# Patient Record
Sex: FEMALE | Race: WHITE | NOT HISPANIC OR LATINO | URBAN - METROPOLITAN AREA
[De-identification: names, ages, dates, MRNs, and addresses within clinical notes are randomized per-mention and may not be internally consistent; named-entity substitution may affect disease eponyms.]

---

## 2017-10-19 DIAGNOSIS — H90.2 CONDUCTIVE HEARING LOSS: Primary | ICD-10-CM

## 2017-10-20 ENCOUNTER — OFFICE VISIT (OUTPATIENT)
Dept: AUDIOLOGY | Facility: CLINIC | Age: 32
End: 2017-10-20
Attending: OTOLARYNGOLOGY

## 2017-10-20 ENCOUNTER — OFFICE VISIT (OUTPATIENT)
Dept: OTOLARYNGOLOGY | Facility: CLINIC | Age: 32
End: 2017-10-20

## 2017-10-20 DIAGNOSIS — H90.11 CONDUCTIVE HEARING LOSS OF RIGHT EAR WITH UNRESTRICTED HEARING OF LEFT EAR: Primary | ICD-10-CM

## 2017-10-20 DIAGNOSIS — H93.8X1 SENSATION OF FULLNESS IN RIGHT EAR: ICD-10-CM

## 2017-10-20 DIAGNOSIS — Q01.8 TEMPORAL ENCEPHALOCELE (H): ICD-10-CM

## 2017-10-20 DIAGNOSIS — H92.01 PAIN IN RIGHT EAR: ICD-10-CM

## 2017-10-20 DIAGNOSIS — H69.91 EUSTACHIAN TUBE DYSFUNCTION, RIGHT: ICD-10-CM

## 2017-10-20 DIAGNOSIS — H93.11 TINNITUS OF RIGHT EAR: ICD-10-CM

## 2017-10-20 RX ORDER — FLUOXETINE 10 MG/1
5 CAPSULE ORAL DAILY
COMMUNITY
Start: 2017-06-01

## 2017-10-20 ASSESSMENT — PAIN SCALES - GENERAL: PAINLEVEL: EXTREME PAIN (9)

## 2017-10-20 NOTE — LETTER
10/20/2017       RE: Michelle Fisher  [address]  [place] [address]     Dear Colleague,    Thank you for referring your patient, Michelle Fisher, to the [facility] EAR NOSE AND THROAT at [facility]. Please see a copy of my visit note below.    October 20, 2017    Sophia Chávez MD  [facility]  [id number]  [place] [address]      Dear Dr. Chávez,    I had the pleasure of meeting Michelle Fisher in consultation today at the [facility] at your request for tegmen dehiscence and encephalocele.    HISTORY OF PRESENT ILLNESS:  Ms. Fisher is a 32 year old woman who had chronic otitis media during childhood that affected her right ear most severely. She had recurrent infections in the ear but is not sure if she ever had an associated tympanic membrane perforation or ear drainage. She had done relatively well until she went to [place] in June 2017 she swam frequently and travel by plane there and back. Sometime during the trip or following the plane ride she developed significant right ear fullness and felt a loud pop behind the ear and had persistent ear pain and drainage thereafter. She was treated with amoxicillin and eardrops and in attempts to clear a persistent middle ear effusion but it persisted. She has continued to feel significant pressure in the right ear and is aware of a conductive hearing loss. This difference in the hearing seems different to her than the difference in hearing she may have noted in years previously.    She also has been treated for vertigo in the past. This occurs when she bends down and stands up quickly area occasionally she also feels that her balance is off. She has not been having particularly active vertigo lately. Additionally she has had for the last 1-1/2 years severe headaches. Sometimes she awakens with these headaches but also can occur if she gets fatigue. It is not clear to her  if the headaches get better over the course of the day while she is standing. She does not have a history of blurry vision. She has a history of migraine. There is some overlap in these recent headache symptoms and the migraines making it difficult to clearly distinguish them.    Evaluation for her persistent middle ear effusion demonstrated that there was absence of the tegmen tympani on both sides. On the right there was additional fluid versus soft tissue density within the middle ear and an MRI demonstrated right temporal lobe encephalocele. She was referred to Dr. Sow and he planned for a right middle cranial fossa craniotomy for repair of encephalocele. The family reports that there are insurance did not cover care with Dr. Sow or at his facility. They presents now to discuss surgical management here at [facility].    She does not have any active nasal drainage. She does not notice a change in her symptoms when coughing straining or bending.    Past Medical History:   Diagnosis Date     Conductive hearing loss 01/01/2017     Depressive disorder 01/01/2017     Hearing problem 01/01/2017     Migraines 01/01/2015     Recurrent otitis media 01/01/2016       No past surgical history on file.    Past Medical History:   Diagnosis Date     Conductive hearing loss 01/01/2017     Depressive disorder 01/01/2017     Hearing problem 01/01/2017     Migraines 01/01/2015     Recurrent otitis media 01/01/2016   ALLERGIES:  No Known Allergies    Current Outpatient Prescriptions   Medication     FLUoxetine (PROZAC) 10 MG capsule     No current facility-administered medications for this visit.        SOCIAL HISTORY:    Alcohol use Yes   Comment: occasional    History   Smoking Status     Never Smoker   Smokeless Tobacco     Never Used         FAMILY HISTORY:   Family History   Problem Relation Age of Onset     CANCER Maternal Grandmother      Hypertension Maternal Grandmother      Thyroid Disease Maternal Grandmother       Stomach Problem Maternal Grandmother      ulcers     HEART DISEASE Maternal Grandfather      Hypertension Maternal Grandfather      Substance Abuse Maternal Grandfather      Hypertension Mother      Depression Mother      Migraines Mother      CEREBROVASCULAR DISEASE Paternal Grandfather      CEREBROVASCULAR DISEASE Paternal Grandmother      MENTAL ILLNESS Paternal Grandmother         REVIEW OF SYSTEMS:   ENT ROS 10/16/2017   Constitutional Unexplained fatigue   Neurology Headache   Psychology Frequently feeling depressed or sad   Ears, Nose, Throat Hearing loss, Ear pain       PHYSICIAL EXAMINATION:  Constitutional: The patient was well-groomed and in no acute distress.   Skin: Warm and pink.  Psychiatric: The patient's affect was calm, cooperative, and appropriate.   Respiratory: Breathing comfortably without stridor or exertion of accessory muscles.  Eyes: Pupils were equal and reactive. Extraocular movement intact.   Head: Normocephalic and atraumatic. No lesions or scars.  Ears: The ears were examined under the otomicroscope. The right tympanic membrane is intact but there is a what appears to be a soft tissue lesion abutting the tympanic membrane making it appear to be quite thickened. This would be consistent with an encephalocele. Left tympanic membrane is intact with aerated middle ear space.  Neurologic: Alert and oriented x 3. Cranial nerves III-XI within normal limits. Voice quality normal.  Vestibular examination: No spontaneous or gaze evoked nystagmus.Normal gait.    Audiogram:  Normal-appearing left ear with 10 dB speech reception threshold and 96% word recognition score and type A tympanogram. Intact left ipsilateral acoustic reflex. Right moderate conductive hearing loss with 40 dB speech are suction threshold and 100% word recognition score and type B tympanogram.    Imaging: This was reviewed with the patient and her mom. On both sides the tegmen tympani appears to be absent overlying the  malleus and there appears to be contact of soft tissue of the dura with the malleus head. On the right the tegmen defect appears to be more extensive and there is fluid density within the residual mastoid air cells as well as dehiscence of the tegmen mastoideum. The MRI was reviewed including the coronal images and there does appear to be an encephalocele within the tegmen defect and the residual mastoid air cells are filled with T2 hyperintense fluid which is consistent with CSF. Notably on the CT there is an air pocket in the inferior aspect of the right middle ear.    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 In retrospect she believes that the asymmetric hearing loss has been present for many years. She does not have active drainage from the nose to suggest CSF rhinorrhea, but the imaging is highly suspicious for encephalocele and CSF within the mastoid air cell system. This is favored over residual effusion following otitis media. At this time given that there is a soft tissue density behind the tympanic membrane, a tympanocentesis is unlikely to be helpful in the clinic.    We reviewed the images together in detail. We reviewed that the risk of untreated encephalocele is that one could develop bacterial infection that could enter the intracranial space and cause meningitis. I recommended that she have pneumococcal vaccinations placed and we provided the information for this. We discussed the middle cranial fossa approach to repair of the encephalocele and the rationale for this. We discussed the common and serious risks in detail and the preoperative planning process. This procedure is performed as a team here at the [facility] and I work with neurosurgeon Dr. Ramirez Lama. The patient and her mom indicated that they will also need to confirm with her insurance that they are able to return to see both of us and have the procedure  performed here at their discretion. We will be happy to assist them in checking with the insurance regarding this.    Our plan will be for them to return to the skull base surgery clinic to finalize surgical plans with our team that includes me and Dr. Lama. She will require a preoperative anesthesia consultation prior to surgery per hour institution requirement for intracranial cases.    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 Manipulation of this during surgery could lead to additional dizziness or hearing loss that can be permanent. At this time she indicated that she would not be inclined to have the canal plugged. In that instance, we may resurface it in order to perform the rest of the procedure. We will discuss this further at her next appointment.    Thank you very much for the opportunity to participate in the care of your patient.    Alexia Oglesby MD  Otology & Neurotology  [facility]    I spent a total of 45 minutes face-to-face with Michelle Fisher during today s office visit. Over 50% of this time was spent counseling the patient and/or coordinating care regarding the right sided symptoms and lesion.

## 2017-10-20 NOTE — PATIENT INSTRUCTIONS
Dr. Oglesby' nursing staff will work on the followin.  Talk with the financial department re: insurance approval to see Dr. Lama.  2.  Will work to find a surgical date for you.   -Proposed procedure: right middle crani fossa repair with Dr. Oglesby & Dr. Lama  3.  Will work to find a clinic visit for you with both Dr. Oglesby & Dr. Lama.  4.  Will work to schedule a PAC appointment prior to surgery.  5.  Patient may need to repeat CT scan, will discuss at next clinic visit.    *Vaccine handout given at today's visit.

## 2017-10-20 NOTE — MR AVS SNAPSHOT
After Visit Summary   10/20/2017    Michelle Fisher    MRN: [id number]           Patient Information     Date Of Birth          1985        Visit Information        Provider Department      10/20/2017 11:30 AM Alexia Oglesby MD [facility] Ear Nose and Throat        Care Instructions    Dr. Oglesby' nursing staff will work on the followin.  Talk with the financial department re: insurance approval to see Dr. Lama.  2.  Will work to find a surgical date for you.   -Proposed procedure: right middle crani fossa repair with Dr. Oglesby & Dr. Lama  3.  Will work to find a clinic visit for you with both Dr. Oglesby & Dr. Lama.  4.  Will work to schedule a PAC appointment prior to surgery.  5.  Patient may need to repeat CT scan, will discuss at next clinic visit.    *Vaccine handout given at today's visit.          Follow-ups after your visit        Who to contact     Please call your clinic at [phone] to:    Ask questions about your health    Make or cancel appointments    Discuss your medicines    Learn about your test results    Speak to your doctor   If you have compliments or concerns about an experience at your clinic, or if you wish to file a complaint, please contact [facility] Patient Relations at [phone] or email us at [email or web address]         Additional Information About Your Visit        MyChart Information     [employer] gives you secure access to your electronic health record. If you see a primary care provider, you can also send messages to your care team and make appointments. If you have questions, please call your primary care clinic.  If you do not have a primary care provider, please call [phone] and they will assist you.      [email or web address] is an electronic gateway that provides easy, online access to your medical records. With [email or web address], you can request a clinic appointment, read your test results, renew a prescription or  communicate with your care team.     To access your existing account, please contact your [facility] or call [phone] for assistance.        Care EveryWhere ID     This is your Care EveryWhere ID. This could be used by other organizations to access your [place] medical records  [id number]         Blood Pressure from Last 3 Encounters:   No data found for BP    Weight from Last 3 Encounters:   No data found for Wt              Today, you had the following     No orders found for display       Primary Care Provider    None Specified       No primary provider on file.        Equal Access to Services     [facility]ANEENSanta [facility] Hospital[facility]ORGAN : Hadii aad ku hadasho Soomaali, waaxda luqadaha, qaybta kaalmada adeegyada, waxay sonyain pitan jim cesar . So [facility] [phone].    ATENCIÓN: Si habla español, tiene a terry disposición servicios gratuitos de asistencia lingüística. Llame al [phone].    We comply with applicable federal civil rights laws and [place] laws. We do not discriminate on the basis of race, color, national origin, age, disability, sex, sexual orientation, or gender identity.            Thank you!     Thank you for choosing [facility] EAR NOSE AND THROAT  for your care. Our goal is always to provide you with excellent care. Hearing back from our patients is one way we can continue to improve our services. Please take a few minutes to complete the written survey that you may receive in the mail after your visit with us. Thank you!             Your Updated Medication List - Protect others around you: Learn how to safely use, store and throw away your medicines at www.disposemymeds.org.          This list is accurate as of: 10/20/17  1:17 PM.  Always use your most recent med list.                   Brand Name Dispense Instructions for use Diagnosis    PROZAC 10 MG capsule   Generic drug:  FLUoxetine

## 2017-10-20 NOTE — NURSING NOTE
Chief Complaint   Patient presents with     Consult     New Neurotology - Conductive hearing loss, Right ear pain of 9 today.        SANDRA Nath LPN

## 2017-10-20 NOTE — PROGRESS NOTES
AUDIOLOGY REPORT    SUMMARY: Audiology visit completed. See audiogram for results.      RECOMMENDATIONS: Follow-up with ENT.      Yajaira Lopes  Audiologist  [place] License  #[id number]

## 2017-11-04 NOTE — PROGRESS NOTES
October 20, 2017    Sophia Chávez MD  [facility]  [id number]  [place] [address]      Dear Dr. Chávez,    I had the pleasure of meeting Michelle Fisher in consultation today at the [facility] Otolaryngology Clinic at your request for tegmen dehiscence and encephalocele.    HISTORY OF PRESENT ILLNESS:  Ms. Fisher is a 32 year old woman who had chronic otitis media during childhood that affected her right ear most severely. She had recurrent infections in the ear but is not sure if she ever had an associated tympanic membrane perforation or ear drainage. She had done relatively well until she went to [place] in June 2017 she swam frequently and travel by plane there and back. Sometime during the trip or following the plane ride she developed significant right ear fullness and felt a loud pop behind the ear and had persistent ear pain and drainage thereafter. She was treated with amoxicillin and eardrops and in attempts to clear a persistent middle ear effusion but it persisted. She has continued to feel significant pressure in the right ear and is aware of a conductive hearing loss. This difference in the hearing seems different to her than the difference in hearing she may have noted in years previously.    She also has been treated for vertigo in the past. This occurs when she bends down and stands up quickly area occasionally she also feels that her balance is off. She has not been having particularly active vertigo lately. Additionally she has had for the last 1-1/2 years severe headaches. Sometimes she awakens with these headaches but also can occur if she gets fatigue. It is not clear to her if the headaches get better over the course of the day while she is standing. She does not have a history of blurry vision. She has a history of migraine. There is some overlap in these recent headache symptoms and the migraines making it difficult to clearly distinguish  them.    Evaluation for her persistent middle ear effusion demonstrated that there was absence of the tegmen tympani on both sides. On the right there was additional fluid versus soft tissue density within the middle ear and an MRI demonstrated right temporal lobe encephalocele. She was referred to Dr. Sow and he planned for a right middle cranial fossa craniotomy for repair of encephalocele. The family reports that there are insurance did not cover care with Dr. Sow or at his facility. They presents now to discuss surgical management here at [place].    She does not have any active nasal drainage. She does not notice a change in her symptoms when coughing straining or bending.    Past Medical History:   Diagnosis Date     Conductive hearing loss 01/01/2017     Depressive disorder 01/01/2017     Hearing problem 01/01/2017     Migraines 01/01/2015     Recurrent otitis media 01/01/2016       No past surgical history on file.    Past Medical History:   Diagnosis Date     Conductive hearing loss 01/01/2017     Depressive disorder 01/01/2017     Hearing problem 01/01/2017     Migraines 01/01/2015     Recurrent otitis media 01/01/2016   ALLERGIES:  No Known Allergies    Current Outpatient Prescriptions   Medication     FLUoxetine (PROZAC) 10 MG capsule     No current facility-administered medications for this visit.        SOCIAL HISTORY:    Alcohol use Yes   Comment: occasional    History   Smoking Status     Never Smoker   Smokeless Tobacco     Never Used         FAMILY HISTORY:   Family History   Problem Relation Age of Onset     CANCER Maternal Grandmother      Hypertension Maternal Grandmother      Thyroid Disease Maternal Grandmother      Stomach Problem Maternal Grandmother      ulcers     HEART DISEASE Maternal Grandfather      Hypertension Maternal Grandfather      Substance Abuse Maternal Grandfather      Hypertension Mother      Depression Mother      Migraines Mother      CEREBROVASCULAR DISEASE  Paternal Grandfather      CEREBROVASCULAR DISEASE Paternal Grandmother      MENTAL ILLNESS Paternal Grandmother         REVIEW OF SYSTEMS:   ENT ROS 10/16/2017   Constitutional Unexplained fatigue   Neurology Headache   Psychology Frequently feeling depressed or sad   Ears, Nose, Throat Hearing loss, Ear pain       PHYSICIAL EXAMINATION:  Constitutional: The patient was well-groomed and in no acute distress.   Skin: Warm and pink.  Psychiatric: The patient's affect was calm, cooperative, and appropriate.   Respiratory: Breathing comfortably without stridor or exertion of accessory muscles.  Eyes: Pupils were equal and reactive. Extraocular movement intact.   Head: Normocephalic and atraumatic. No lesions or scars.  Ears: The ears were examined under the otomicroscope. The right tympanic membrane is intact but there is a what appears to be a soft tissue lesion abutting the tympanic membrane making it appear to be quite thickened. This would be consistent with an encephalocele. Left tympanic membrane is intact with aerated middle ear space.  Neurologic: Alert and oriented x 3. Cranial nerves III-XI within normal limits. Voice quality normal.  Vestibular examination: No spontaneous or gaze evoked nystagmus.Normal gait.    Audiogram:  Normal-appearing left ear with 10 dB speech reception threshold and 96% word recognition score and type A tympanogram. Intact left ipsilateral acoustic reflex. Right moderate conductive hearing loss with 40 dB speech are suction threshold and 100% word recognition score and type B tympanogram.    Imaging: This was reviewed with the patient and her mom. On both sides the tegmen tympani appears to be absent overlying the malleus and there appears to be contact of soft tissue of the dura with the malleus head. On the right the tegmen defect appears to be more extensive and there is fluid density within the residual mastoid air cells as well as dehiscence of the tegmen mastoideum. The MRI  was reviewed including the coronal images and there does appear to be an encephalocele within the tegmen defect and the residual mastoid air cells are filled with T2 hyperintense fluid which is consistent with CSF. Notably on the CT there is an air pocket in the inferior aspect of the right middle ear.    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 In retrospect she believes that the asymmetric hearing loss has been present for many years. She does not have active drainage from the nose to suggest CSF rhinorrhea, but the imaging is highly suspicious for encephalocele and CSF within the mastoid air cell system. This is favored over residual effusion following otitis media. At this time given that there is a soft tissue density behind the tympanic membrane, a tympanocentesis is unlikely to be helpful in the clinic.    We reviewed the images together in detail. We reviewed that the risk of untreated encephalocele is that one could develop bacterial infection that could enter the intracranial space and cause meningitis. I recommended that she have pneumococcal vaccinations placed and we provided the information for this. We discussed the middle cranial fossa approach to repair of the encephalocele and the rationale for this. We discussed the common and serious risks in detail and the preoperative planning process. This procedure is performed as a team here at the [facility] and I work with neurosurgeon Dr. Ramirez Lama. The patient and her mom indicated that they will also need to confirm with her insurance that they are able to return to see both of us and have the procedure performed here at their discretion. We will be happy to assist them in checking with the insurance regarding this.    Our plan will be for them to return to the skull base surgery clinic to finalize surgical plans with our team that includes me and Dr. Lama. She will  require a preoperative anesthesia consultation prior to surgery per hour institution requirement for intracranial cases.    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 Manipulation of this during surgery could lead to additional dizziness or hearing loss that can be permanent. At this time she indicated that she would not be inclined to have the canal plugged. In that instance, we may resurface it in order to perform the rest of the procedure. We will discuss this further at her next appointment.    Thank you very much for the opportunity to participate in the care of your patient.    Alexia Oglesby MD  Otology & Neurotology  [facility]    I spent a total of 45 minutes face-to-face with Michelle Fisher during today s office visit. Over 50% of this time was spent counseling the patient and/or coordinating care regarding the right sided symptoms and lesion.

## 2017-11-09 ENCOUNTER — CARE COORDINATION (OUTPATIENT)
Dept: OTOLARYNGOLOGY | Facility: CLINIC | Age: 32
End: 2017-11-09

## 2017-11-09 DIAGNOSIS — Q01.9 ENCEPHALOCELE (H): Primary | ICD-10-CM

## 2017-11-09 NOTE — PROGRESS NOTES
Called and spoke with patient to let her know that our financial department checked with patient's insurance and  she has 10 visits related to the treatment of the condition with Dr Oglesby and the authorization would cover office visits with providers required to do the procedure as indicated by Dr. Oglesby so she should be able to schedule with Dr. Lama. Will have  call patient to arrange appointment in crani clinic with Dr. Oglesby and Dr. Lama to discuss surgery. Patient would like to return on 11/28, we will also have her complete a PAC appointment the same day. Surgery scheduler is working on surgery date and time for patient with Dr. Oglesby and Dr. Lama. We will keep patient informed once surgical information is known. Patient agreeable to plan and was encouraged to call with further questions or concerns.     Rylie Barker, RN, BSN

## 2017-11-10 ENCOUNTER — TELEPHONE (OUTPATIENT)
Dept: OTOLARYNGOLOGY | Facility: CLINIC | Age: 32
End: 2017-11-10

## 2017-11-10 NOTE — TELEPHONE ENCOUNTER
11/10/17  Contacted patient by phone and discussed surgery dates.  1/11/18 is first available with Dr Oglesby and Dr Betancourt.    PAC H&P  12/12/17  1:30 pm  Clinic recheck 12/12/17  3:30  Dr Oglesby and Dr Lama    Surgery 1/11/18 Middle Cranial Fossa Encephalocele Repair    Post op 1/23/18  Dr Oglesby and Dr Lama    Surgery packet mailed - teaching and instructions to be done on 12/12/17 with ENT Nurse Coordinator      Erika Zelaya   ENT Divine-Op Coordinator  [phone]

## 2017-12-11 ENCOUNTER — ANESTHESIA EVENT (OUTPATIENT)
Dept: SURGERY | Facility: CLINIC | Age: 32
End: 2017-12-11

## 2017-12-12 ENCOUNTER — OFFICE VISIT (OUTPATIENT)
Dept: SURGERY | Facility: CLINIC | Age: 32
End: 2017-12-12

## 2017-12-12 ENCOUNTER — OFFICE VISIT (OUTPATIENT)
Dept: OTOLARYNGOLOGY | Facility: CLINIC | Age: 32
End: 2017-12-12

## 2017-12-12 ENCOUNTER — ALLIED HEALTH/NURSE VISIT (OUTPATIENT)
Dept: SURGERY | Facility: CLINIC | Age: 32
End: 2017-12-12

## 2017-12-12 VITALS
SYSTOLIC BLOOD PRESSURE: 111 MMHG | TEMPERATURE: 97.7 F | HEART RATE: 76 BPM | OXYGEN SATURATION: 99 % | DIASTOLIC BLOOD PRESSURE: 76 MMHG | BODY MASS INDEX: 24.17 KG/M2 | HEIGHT: 60 IN | WEIGHT: 123.1 LBS

## 2017-12-12 VITALS — HEIGHT: 60 IN | BODY MASS INDEX: 24.15 KG/M2 | WEIGHT: 123 LBS

## 2017-12-12 DIAGNOSIS — Z01.818 PRE-OP EVALUATION: ICD-10-CM

## 2017-12-12 DIAGNOSIS — Q01.9 ENCEPHALOCELE (H): Primary | ICD-10-CM

## 2017-12-12 DIAGNOSIS — Q01.9 ENCEPHALOCELE (H): ICD-10-CM

## 2017-12-12 DIAGNOSIS — Q01.8 TEMPORAL ENCEPHALOCELE (H): Primary | ICD-10-CM

## 2017-12-12 LAB
ANION GAP SERPL CALCULATED.3IONS-SCNC: 4 MMOL/L (ref 3–14)
APTT PPP: 37 SEC (ref 22–37)
BUN SERPL-MCNC: 11 MG/DL (ref 7–30)
CALCIUM SERPL-MCNC: 8.6 MG/DL (ref 8.5–10.1)
CHLORIDE SERPL-SCNC: 106 MMOL/L (ref 94–109)
CO2 SERPL-SCNC: 30 MMOL/L (ref 20–32)
CREAT SERPL-MCNC: 0.75 MG/DL (ref 0.52–1.04)
ERYTHROCYTE [DISTWIDTH] IN BLOOD BY AUTOMATED COUNT: 11.9 % (ref 10–15)
GFR SERPL CREATININE-BSD FRML MDRD: 89 ML/MIN/1.7M2
GLUCOSE SERPL-MCNC: 90 MG/DL (ref 70–99)
HCT VFR BLD AUTO: 41.4 % (ref 35–47)
HGB BLD-MCNC: 13.6 G/DL (ref 11.7–15.7)
INR PPP: 1 (ref 0.86–1.14)
MCH RBC QN AUTO: 30.6 PG (ref 26.5–33)
MCHC RBC AUTO-ENTMCNC: 32.9 G/DL (ref 31.5–36.5)
MCV RBC AUTO: 93 FL (ref 78–100)
PLATELET # BLD AUTO: 259 10E9/L (ref 150–450)
POTASSIUM SERPL-SCNC: 3.6 MMOL/L (ref 3.4–5.3)
RBC # BLD AUTO: 4.45 10E12/L (ref 3.8–5.2)
SODIUM SERPL-SCNC: 139 MMOL/L (ref 133–144)
WBC # BLD AUTO: 6 10E9/L (ref 4–11)

## 2017-12-12 ASSESSMENT — LIFESTYLE VARIABLES: TOBACCO_USE: 0

## 2017-12-12 ASSESSMENT — PAIN SCALES - GENERAL: PAINLEVEL: NO PAIN (0)

## 2017-12-12 ASSESSMENT — ENCOUNTER SYMPTOMS: SEIZURES: 0

## 2017-12-12 NOTE — NURSING NOTE
Chief Complaint   Patient presents with     RECHECK     surgery talk     Maria Alejandra Hoang Medical Assistant

## 2017-12-12 NOTE — PATIENT INSTRUCTIONS
Preparing for Your Surgery      Name:  Michelle Fisher   MRN:  [id number]   :  1985   Today's Date:  2017     Arriving for surgery:  Surgery date:  18  Arrival time:  05:30 a.m.  Please come to:       [facility] [address]  [address] [place]  [address]    -   parking is available in front of the hospital from 5:15 am to 8:00 pm    -  Stop at the Information Desk in the lobby    -   Inform the information person that you are here for surgery. An escort to 3c will be provided. If you would not like an escort, please proceed to 3C on the 3rd floor. [phone]     What can I eat or drink?  -  You may have solid food or milk products until 8 hours prior to your surgery  11:00 p.m.  -  You may have water, apple juice or 7up/Sprite until 2 hours prior to your surgery 05:30 a.m.    Which medicines can I take?  -  Do NOT take these medications in the morning, the day of surgery:      -  Please take these medications the day of surgery:     Prozac     How do I prepare myself?  -  Take two showers: one the night before surgery; and one the morning of surgery.         Use Scrubcare or Hibiclens to wash from neck down.  You may use your own shampoo and conditioner. No other hair products.   -  Do NOT use lotion, powder, deodorant, or antiperspirant the day of your surgery.  -  Do NOT wear any makeup, fingernail polish or jewelry.    Bring your ID and insurance card.    Questions or Concerns:  If you have questions or concerns, please call the  Preoperative Assessment Center, Monday-Friday 7AM-7PM:  [phone]          AFTER YOUR SURGERY  Breathing exercises   Breathing exercises help you recover faster. Take deep breaths and let the air out slowly. This will:     Help you wake up after surgery.    Help prevent complications like pneumonia.  Preventing complications will help you go home sooner.   We may give you a breathing device (incentive spirometer) to  encourage you to breathe deeply.   Nausea and vomiting   You may feel sick to your stomach after surgery; if so, let your nurse know.    Pain control:  After surgery, you may have pain. Our goal is to help you manage your pain. Pain medicine will help you feel comfortable enough to do activities that will help you heal.  These activities may include breathing exercises, walking and physical therapy.   To help your health care team treat your pain we will ask: 1) If you have pain  2) where it is located 3) describe your pain in your words  Methods of pain control include medications given by mouth, vein or by nerve block for some surgeries.  We may give you a pain control pump that will:  1) Deliver the medicine through a tube placed in your vein  2) Control the amount of medicine you receive  3) Allow you to push a button to deliver a dose of pain medicine  Sequential Compression Device (SCD) or Pneumo Boots:  You may need to wear SCD S on your legs or feet. These are wraps connected to a machine that pumps in air and releases it. The repeated pumping helps prevent blood clots from forming.

## 2017-12-12 NOTE — LETTER
12/12/2017       RE: Michelle Fisher  [address]  [facility] [address]     Dear Colleague,    Thank you for referring your patient, Michelle Fisher, to the [facility] E[facility] at [facility]. Please see a copy of my visit note below.    Ms. Fisher is seen today at Dr. 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      Michelle was found to have a mass in the middle ear and was referred to Dr. Oglesby.  Imaging including MRI and CT confirmed dehiscence in the tegmen tympani and soft tissue mass in the middle ear surrounding the ossicles consistent with a temporal hernia.  There is some fluid in the mastoid air cells on that side as well.      She has no evidence of external CSF leakage and has not had meningitis.      PHYSICAL EXAMINATION:  On examination, she has normal facial nerve function.  She has a significant conductive hearing loss on the right side consistent with the soft tissue mass in her middle ear.      We reviewed the imaging and it is as described.  There is a fairly extensive defect in the floor of the middle fossa.  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      RECOMMENDATIONS:  We have discussed with Michelle and her mother the fact that an operation at this point would be preventive.  The goal will be to remove the soft tissue mass in the middle ear and hopefully improve hearing and to repair any openings in the dura and the middle fossa floor so as to prevent progression of the herniation and eventual overt CSF leakage.      I discussed with them the nature of the temporal craniotomy, the use of a lumbar spinal drainage and osmotic diuresis to shrink the brain and intradural volume so that we can safely separate the dura  from the middle fossa floor so that Dr. Oglesby can remove the herniated tissue and we can repair the middle fossa floor with a titanium plate and dural substitutes.  We would close the dura either primarily or with dural substitute as necessary.      She understands the risks of seizure, subdural and epidural hematoma, the remote risks of brain injury.  She understands the risk of CSF leakage following the procedure, the low risk of infection from the operation, and the possibility that the repair could fail.      She and her mother asked a number of excellent questions, which we answered.  She is presently on the OR schedule for .         ALVIN CRUZ MD             D: 2017 18:32   T: 2017 14:19   MT: CRISTIANO      Name:     EUN CASTILLO   MRN:      [id number]        Account:      [id number]   :      1985           Service Date: 2017      Document: [id number]

## 2017-12-12 NOTE — NURSING NOTE
Chief Complaint   Patient presents with     RECHECK     pre surgery      Maria Alejandra Hoang Medical Assistant

## 2017-12-12 NOTE — NURSING NOTE
Teaching Flowsheet - ENT   Relevant Diagnosis: encephalocele  Teaching Topic:Middle cranial fossa encephalocele repair  Person(s) involved in teaching:       Motivation Level:  Asks Questions:   Yes  Eager to Learn:   Yes  Cooperative:   Yes  Receptive (willing/able to accept information):   Yes  Comments: Reviewed pre-op H and P,  NPO prior to  surgery,  pre-op scrub (given Hibiclens)  Reviewed post-op  cares , activity and pain.     Patient demonstrates understanding of the following:  Reason for the appointment, diagnosis and treatment plan:   Yes  Knowledge of proper use of medications and conditions for which they are ordered (with special attention to potential side effects or drug interactions):  stop aspirin products 1 week before surgery Yes  Which situations necessitate calling provider and whom to contact:   Yes  Nutritional needs and diet plan:   Yes  Pain management techniques:   Yes  Patient instructed on hand hygiene:  Yes  How and/when to access community resources:   Yes     Infection Prevention:  Patient   demonstrates understanding of the following:  Surgical procedure site care taught Yes  Signs and symptoms of infection taught Yes  Wound care taught Yes  Instructional Materials Used/Given: pre- op booklet,verbal  Instruction.      Rylie Barker, RN, BSN

## 2017-12-12 NOTE — PATIENT INSTRUCTIONS
SKULL BASE SURGERY    PRE-OPERATION    A history and physical needs to be competed by your primary care physician within 30 days of your surgery date. You will have a consult with the anesthesia clinic about one week before your surgery.   Maintaining a healthy well balanced diet, increasing fiber - even starting a stool softeners, and quitting or decreasing smoking will help prepare your body for your surgery.    Stop aspirin and any other blood thinner 5-7 days surgery, before surgery. Diabetic medication is held the rnorning of surgery, however, please discuss all your medications with your primary care physicians at the, time of your physical.    DO NOT EAT OR DRINK ANYTHING AFTER 12 MIDNIGHT THE NIGHT BEFORE YOUR SURGERY.    The night before and the morning of your surgery, use the pre-op soap, wash from head to toe. Use freshly laundered towels and clothing.    HOSPITAL STAY    The usual hospital stay is 5-7 days. The first 24 hours is in thc Intensive Care Unit (ICU). You will be monitored very closely and may have some the following: Intravenous fluids (IV's), catheter for urine, cardiac monitor, lumbar drain or feeding tube.    The turban head dressing will be on for 5 days. This is tight fitting and helps prevent post-op swelling. After the ICU, you will be moved to a regular hospital bed/floor.  The rest of your post-op recovery will focus on your individual needs which may include:  helping with balance, nausea, swallowing issues, bowel management, head/jaw pain, facial weakness and incision carc (head and abdomen).    HOME RECOVERY    Each patient's recovery is different, as is their diagnosis, age and overall health status. Most people like to have someone stay with them the first few days at home and then after that check in on them a few hours a day to help with bathing,  preparing meals, cleaning, laundry and other personal needs. It is common to feel tired and fatigued for the first few  weeks/months. It is best to increase activity gradually  Activity    Most patients take 6-8- weeks off of work. Avoid strenuous exercise and activity for 6-8 weeks. No heavy lifting for 6-8  weeks. If you have to hold your breath or bear down to lift something, then it is too heavy. Extra pressure on the head can disrupt the healing of  the internal surgical area.    Incision Care    Check your incision every day. Call the ENT clinic if you notice redness, swelling, drainage, separation of wound edges, increaged pain or bogginess (fluid collection around the incision). Keep incisions clean and dry. As the incision heals and the nerves are starting to 'wake up' you may experience sharp shooting pains. This lasts only a few  seconds and is part of the normal recovery. You may wash your hair with mild shampoo. Follow any instructions that are given to you at the time of discharge from the hospital.    Diet    A well balanced diet is recommended for recovery. Resume any specially prescribed diet you were taking before surgery. Extra fruit and fiber will help prevent constipation, which frequently occurs while taking pain medication.    Call the Clinic    Incisions with drainage, swelling, redness, any fluid collection around the incision area.    Temperature of 101 degrees or greater.    Stiff Neck    Clear nasal drainage which you did not have before surgery    Increase in facial weakness    Pain not managed by your pain medication    ENT CLINIC Monday - Friday 8:00 am - 4:30 [phone] nurse triage.    AFTER HOURS, WEEKENDS, HOLIDAYS: Call the Hospital  [phone] and ask for the ENT on call.

## 2017-12-12 NOTE — ANESTHESIA PREPROCEDURE EVALUATION
Anesthesia Evaluation     . Pt has not had prior anesthetic            ROS/MED HX    ENT/Pulmonary:  - neg pulmonary ROS    (-) tobacco use   Neurologic:     (+)migraines, other neuro right encephalocele   (-) seizures   Cardiovascular:  - neg cardiovascular ROS   (+) ----. : . . . :. . No previous cardiac testing       METS/Exercise Tolerance: Comment: Can walk 5 miles.  On feet all day at work.  >4 METS   Hematologic:  - neg hematologic  ROS       Musculoskeletal:  - neg musculoskeletal ROS       GI/Hepatic:  - neg GI/hepatic ROS       Renal/Genitourinary:  - ROS Renal section negative       Endo:  - neg endo ROS       Psychiatric:     (+) psychiatric history depression      Infectious Disease:  - neg infectious disease ROS       Malignancy:      - no malignancy   Other:    (+) Possibly pregnant LMP: Nov 27, 2017,                    Physical Exam  Normal systems: dental    Airway   Mallampati: I  TM distance: >3 FB  Neck ROM: full    Dental     Cardiovascular   Rhythm and rate: regular and normal  (-) carotid bruit is not present, no peripheral edema and no murmur    Pulmonary    breath sounds clear to auscultation    Other findings:   12/12/2017 14:57  Sodium: 139  Potassium: 3.6  Chloride: 106  Carbon Dioxide: 30  Urea Nitrogen: 11  Creatinine: 0.75  GFR Estimate: 89  GFR Estimate If Black: >90  Calcium: 8.6  Anion Gap: 4  Glucose: 90    WBC: 6.0  Hemoglobin: 13.6  Hematocrit: 41.4  Platelet Count: 259  RBC Count: 4.45  MCV: 93  MCH: 30.6  MCHC: 32.9  RDW: 11.9    INR: 1.00  PTT: 37           PAC Discussion and Assessment    ASA Classification: 2  Case is suitable for: [place]  Anesthetic techniques and relevant risks discussed: GA  Invasive monitoring and risk discussed: Yes  Types:   Possibility and Risk of blood transfusion discussed: Yes  NPO instructions given:   Additional anesthetic preparation and risks discussed:   Needs early admission to pre-op area:   Other:     PAC Resident/NP  Anesthesia Assessment:  Scheduled for Lumbar Drain Placement, Middle Cranial Fossa Encephalocele   latex safe  on 1/11/18 by Dr. Lama and Dr. Oglesby in treatment of encephalocele.  PAC referral for risk assessment and optimization for anesthesia with comorbid conditions of:  **1st time having surgery    Pre-operative considerations:  1.  Cardiac:  Functional status very good.  Can walk miles. 0.4%  risk of major adverse cardiac event.  Risk of MACE < 1%. METS>4.  No further cardiac evaluation needed per 2014 ACC/AHA guidelines for non-cardiac surgery.  2.  Pulm:  Airway feasible.  HOMER risk: low.  Non smoker.   3.  GI:  Risk of PONV score = 3.  If 3 or > anti-emetic intervention recommended (with 2 or more meds).   4.  Neuro:  Encephalocele, right ear hearing loss. History of migraines and vertigo.       Patient is optimized and is acceptable candidate for the proposed procedure.  No further diagnostic evaluation is needed.     Patient also evaluated by Dr. Marquez. See recommendations below.           Reviewed and Signed by PAC Mid-Level Provider/Resident  Mid-Level Provider/Resident: Kenna Arias PA-C  Date: 12/12/17  Time: 1345    Attending Anesthesiologist Anesthesia Assessment:  32 year old for lumbar drain, middle crani fossa encephalocele repair. Chart reviewed, patient seen and evaluated; agree with above assessment. Patient has no significant cardiac or pulmonary disease.     Patient is appropriate for the planned procedure without further workup or medical management change. The final anesthesia plan will be determined by the physician anesthesiologist caring for the patient on the day of surgery.      Reviewed and Signed by PAC Anesthesiologist  Anesthesiologist: diana  Date: 12/12/2017  Time:   Pass/Fail: Pass  Disposition:     PAC Pharmacist Assessment:        Pharmacist:   Date:   Time:                           .

## 2017-12-12 NOTE — MR AVS SNAPSHOT
After Visit Summary   2017    Michelle Fisher    MRN: [id number]           Patient Information     Date Of Birth          1985        Visit Information        Provider Department      2017 2:30 PM Rn, [facility] Preoperative Assessment Center        Care Instructions    Preparing for Your Surgery      Name:  Michelle Fisher   MRN:  [id number]   :  1985   Today's Date:  2017     Arriving for surgery:  Surgery date:  18  Arrival time:  05:30 a.m.  Please come to:       [facility] [address]  [address] [phone]    -   parking is available in front of the hospital from 5:15 am to 8:00 pm    -  Stop at the Information Desk in the lobby    -   Inform the information person that you are here for surgery. An escort to 3c will be provided. If you would not like an escort, please proceed to 3C on the 3rd floor. [phone]     What can I eat or drink?  -  You may have solid food or milk products until 8 hours prior to your surgery  11:00 p.m.  -  You may have water, apple juice or 7up/Sprite until 2 hours prior to your surgery 05:30 a.m.    Which medicines can I take?  -  Do NOT take these medications in the morning, the day of surgery:      -  Please take these medications the day of surgery:     Prozac     How do I prepare myself?  -  Take two showers: one the night before surgery; and one the morning of surgery.         Use Scrubcare or Hibiclens to wash from neck down.  You may use your own shampoo and conditioner. No other hair products.   -  Do NOT use lotion, powder, deodorant, or antiperspirant the day of your surgery.  -  Do NOT wear any makeup, fingernail polish or jewelry.    Bring your ID and insurance card.    Questions or Concerns:  If you have questions or concerns, please call the  Preoperative Assessment Center, Monday-Friday 7AM-7PM:  [phone]          AFTER YOUR SURGERY  Breathing exercises    Breathing exercises help you recover faster. Take deep breaths and let the air out slowly. This will:     Help you wake up after surgery.    Help prevent complications like pneumonia.  Preventing complications will help you go home sooner.   We may give you a breathing device (incentive spirometer) to encourage you to breathe deeply.   Nausea and vomiting   You may feel sick to your stomach after surgery; if so, let your nurse know.    Pain control:  After surgery, you may have pain. Our goal is to help you manage your pain. Pain medicine will help you feel comfortable enough to do activities that will help you heal.  These activities may include breathing exercises, walking and physical therapy.   To help your health care team treat your pain we will ask: 1) If you have pain  2) where it is located 3) describe your pain in your words  Methods of pain control include medications given by mouth, vein or by nerve block for some surgeries.  We may give you a pain control pump that will:  1) Deliver the medicine through a tube placed in your vein  2) Control the amount of medicine you receive  3) Allow you to push a button to deliver a dose of pain medicine  Sequential Compression Device (SCD) or Pneumo Boots:  You may need to wear SCD S on your legs or feet. These are wraps connected to a machine that pumps in air and releases it. The repeated pumping helps prevent blood clots from forming.           Follow-ups after your visit        Your next 10 appointments already scheduled     Dec 12, 2017  2:30 PM CST   (Arrive by 2:15 PM)   PAC RN ASSESSMENT with Jenn Pac Rn   [facility] Preoperative Assessment [place] ([place] [facility])    [address] [place] [id number] [phone]            Dec 12, 2017  3:10 PM CST   (Arrive by 2:55 PM)   PAC Anesthesia Consult with Jenn Pac Anesthesiologist   [facility] Preoperative Assessment [place] ([place] [facility])    [address] [address]   [phone]            Dec 12, 2017  3:30 PM CST   (Arrive by 3:15 PM)   RETURN CRANIOFACIAL SKULL BASE with Ramirez Lama MD   [facility] Ear N[facility] Th[facility])    [address] [place] [address]   [phone]            Dec 12, 2017  3:30 PM CST   (Arrive by 3:15 PM)   RETURN CRANIOFACIAL SKULL BASE with Alexia Oglesby MD   [facility])    [address] [place] [address]   [phone]            Dec 12, 2017  3:45 PM CST   LAB with  LAB   [facility] Lab [facility])    [address] [place] [address]   [phone]           Please do not eat 10-12 hours before your appointment if you are coming in fasting for labs on lipids, cholesterol, or glucose (sugar). This does not apply to pregnant women. Water, hot tea and black coffee (with nothing added) are okay. Do not drink other fluids, diet soda or chew gum.            Jan 11, 2018   Procedure with Ramirez Lama MD   [facility], [place], Same Day Surgery (--)    [address] [facility] [address]   [phone]            Jan 23, 2018  2:00 PM CST   (Arrive by 1:45 PM)   RETURN CRANIOFACIAL SKULL BASE with Alexia Oglesby MD   [facility])    [address] [place] [address]   [phone]            Jan 23, 2018  2:00 PM CST   (Arrive by 1:45 PM)   RETURN CRANIOFACIAL SKULL BASE with Ramirez Lama MD   [facility])    [address] [place] [address]   [phone]              Future tests that were ordered for you today     Open Future Orders        Priority Expected Expires Ordered    ABO/Rh type and screen Routine 12/12/2017  1/11/2018 12/12/2017    Basic metabolic panel Routine 12/12/2017 1/11/2018 12/12/2017    CBC with platelets Routine 12/12/2017 1/11/2018 12/12/2017    Partial thromboplastin time Routine 12/12/2017 1/11/2018 12/12/2017    INR Routine 12/12/2017 1/11/2018 12/12/2017            Who to contact     Please call your clinic at [phone] to:    Ask questions about your health    Make or cancel appointments    Discuss your medicines    Learn about your test results    Speak to your doctor   If you have compliments or concerns about an experience at your clinic, or if you wish to file a complaint, please contact [facility] Patient Relations at [phone] or email us at [email or web address]         Additional Information About Your Visit        [employer] Information     [employer] gives you secure access to your electronic health record. If you see a primary care provider, you can also send messages to your care team and make appointments. If you have questions, please call your primary care clinic.  If you do not have a primary care provider, please call [phone] and they will assist you.      Tweetminster is an electronic gateway that provides easy, online access to your medical records. With Tweetminster, you can request a clinic appointment, read your test results, renew a prescription or communicate with your care team.     To access your existing account, please contact your [facility] or call [phone] for assistance.        Care EveryWhere ID     This is your Care EveryWhere ID. This could be used by other organizations to access your [place] medical records  [id number]        Your Vitals Were     Last Period                   11/27/2017 (Exact Date)            Blood Pressure from Last 3 Encounters:   12/12/17 111/76    Weight from Last 3 Encounters:   12/12/17 55.8 kg (123 lb 1.6 oz)              Today, you had the following     No orders found for  display       Primary Care Provider    None Specified       No primary provider on file.        Equal Access to Services     ZINA HUYNH : Hadii aad antonio Christianson, elma green, aleisha henry. So [facility] [phone].    ATENCIÓN: Si habla español, tiene a terry disposición servicios gratuitos de asistencia lingüística. Llame al [phone].    We comply with applicable federal civil rights laws and [place] laws. We do not discriminate on the basis of race, color, national origin, age, disability, sex, sexual orientation, or gender identity.            Thank you!     Thank you for choosing [facility] PREOPERATIVE ASSESSMENT [place]  for your care. Our goal is always to provide you with excellent care. Hearing back from our patients is one way we can continue to improve our services. Please take a few minutes to complete the written survey that you may receive in the mail after your visit with us. Thank you!             Your Updated Medication List - Protect others around you: Learn how to safely use, store and throw away your medicines at www.disposemymeds.org.          This list is accurate as of: 12/12/17  2:27 PM.  Always use your most recent med list.                   Brand Name Dispense Instructions for use Diagnosis    PROZAC 10 MG capsule   Generic drug:  FLUoxetine      Take 5 mg by mouth daily

## 2017-12-12 NOTE — MR AVS SNAPSHOT
After Visit Summary   12/12/2017    Michelle Fisher    MRN: [id number]           Patient Information     Date Of Birth          1985        Visit Information        Provider Department      12/12/2017 3:30 PM Alexia Oglesby MD [facility] Ear Nose and Throat        Care Instructions    SKULL BASE SURGERY    PRE-OPERATION    A history and physical needs to be competed by your primary care physician within 30 days of your surgery date. You will have a consult with the anesthesia clinic about one week before your surgery.   Maintaining a healthy well balanced diet, increasing fiber - even starting a stool softeners, and quitting or decreasing smoking will help prepare your body for your surgery.    Stop aspirin and any other blood thinner 5-7 days surgery, before surgery. Diabetic medication is held the rnorning of surgery, however, please discuss all your medications with your primary care physicians at the, time of your physical.    DO NOT EAT OR DRINK ANYTHING AFTER 12 MIDNIGHT THE NIGHT BEFORE YOUR SURGERY.    The night before and the morning of your surgery, use the pre-op soap, wash from head to toe. Use freshly laundered towels and clothing.    HOSPITAL STAY    The usual hospital stay is 5-7 days. The first 24 hours is in thc Intensive Care Unit (ICU). You will be monitored very closely and may have some the following: Intravenous fluids (IV's), catheter for urine, cardiac monitor, lumbar drain or feeding tube.    The turban head dressing will be on for 5 days. This is tight fitting and helps prevent post-op swelling. After the ICU, you will be moved to a regular hospital bed/floor.  The rest of your post-op recovery will focus on your individual needs which may include:  helping with balance, nausea, swallowing issues, bowel management, head/jaw pain, facial weakness and incision carc (head and abdomen).    HOME RECOVERY    Each patient's recovery is different, as is their diagnosis, age  and overall health status. Most people like to have someone stay with them the first few days at home and then after that check in on them a few hours a day to help with bathing,  preparing meals, cleaning, laundry and other personal needs. It is common to feel tired and fatigued for the first few weeks/months. It is best to increase activity gradually  Activity    Most patients take 6-8- weeks off of work. Avoid strenuous exercise and activity for 6-8 weeks. No heavy lifting for 6-8  weeks. If you have to hold your breath or bear down to lift something, then it is too heavy. Extra pressure on the head can disrupt the healing of  the internal surgical area.    Incision Care    Check your incision every day. Call the ENT clinic if you notice redness, swelling, drainage, separation of wound edges, increaged pain or bogginess (fluid collection around the incision). Keep incisions clean and dry. As the incision heals and the nerves are starting to 'wake up' you may experience sharp shooting pains. This lasts only a few  seconds and is part of the normal recovery. You may wash your hair with mild shampoo. Follow any instructions that are given to you at the time of discharge from the hospital.    Diet    A well balanced diet is recommended for recovery. Resume any specially prescribed diet you were taking before surgery. Extra fruit and fiber will help prevent constipation, which frequently occurs while taking pain medication.    Call the Clinic    Incisions with drainage, swelling, redness, any fluid collection around the incision area.    Temperature of 101 degrees or greater.    Stiff Neck    Clear nasal drainage which you did not have before surgery    Increase in facial weakness    Pain not managed by your pain medication    ENT CLINIC Monday - Friday 8:00 am - 4:30 [phone] nurse triage.    AFTER HOURS, WEEKENDS, HOLIDAYS: Call the Hospital  [phone] and ask for the ENT on call.          Follow-ups  after your visit        Your next 10 appointments already scheduled     Jan 11, 2018   Procedure with Ramirez Lama MD   [facility], [place], Same Day Surgery (--)    [address]  [facility] [address] [phone]            Jan 23, 2018  2:00 PM CST   (Arrive by 1:45 PM)   RETURN CRANIOFACIAL SKULL BASE with MD JEAN Soria [facility] Ear Nose and Throat ([facility])    [address] [place] [address] [phone]            Jan 23, 2018  2:00 PM CST   (Arrive by 1:45 PM)   RETURN CRANIOFACIAL SKULL BASE with MD JEAN Mohan [facility] Ear Nose and Throat ([facility])    [address] [address] [phone]              Who to contact     Please call your clinic at [phone] to:    Ask questions about your health    Make or cancel appointments    Discuss your medicines    Learn about your test results    Speak to your doctor   If you have compliments or concerns about an experience at your clinic, or if you wish to file a complaint, please contact [facility] Patient Relations at [phone] or email us at [email or web address]         Additional Information About Your Visit        [employer] Information     TrustedAd gives you secure access to your electronic health record. If you see a primary care provider, you can also send messages to your care team and make appointments. If you have questions, please call your primary care clinic.  If you do not have a primary care provider, please call [phone] and they will assist you.      TrustedAd is an electronic gateway that provides easy, online access to your medical records. With TrustedAd, you can request a clinic appointment, read your test results, renew a prescription or communicate with your care team.     To access your existing account, please contact your [facility] or call [phone] for assistance.        Care EveryWhere ID     This is your Care EveryWhere ID. This could be used by other organizations to access your [place] medical records  [id number]        Your Vitals Were     Height Last Period BMI (Body Mass Index)             1.524 m (5') 11/27/2017 (Exact Date) 24.02 kg/m2          Blood Pressure from Last 3 Encounters:   12/12/17 111/76    Weight from Last 3 Encounters:   12/12/17 55.8 kg (123 lb)   12/12/17 55.8 kg (123 lb 1.6 oz)              Today, you had the following     No orders found for display       Primary Care Provider    None Specified       No primary provider on file.        Equal Access to Services     ZINA HUNYH : Emil Christianson, elma green, tyrone huerta, aleisha cesar . So [facility] [phone].    ATENCIÓN: Si habla español, tiene a terry disposición servicios gratuitos de asistencia lingüística. Llame al [phone].    We comply with applicable federal civil rights laws and [place] laws. We do not discriminate on the basis of race, color, national origin, age, disability, sex, sexual orientation, or gender identity.            Thank you!     Thank you for choosing [facility] EAR NOSE AND THROAT  for your care. Our goal is always to provide you with excellent care. Hearing back from our patients is one way we can continue to improve our services. Please take a few minutes to complete the written survey that you may receive in the mail after your visit with us. Thank you!             Your Updated Medication List - Protect others around you: Learn how to safely use, store and throw away your medicines at www.disposemymeds.org.          This list is accurate as of: 12/12/17  5:28 PM.  Always use your most recent med list.                   Brand Name Dispense Instructions for use Diagnosis    PROZAC 10 MG capsule   Generic drug:  FLUoxetine      Take 5 mg by mouth daily

## 2017-12-12 NOTE — MR AVS SNAPSHOT
After Visit Summary   12/12/2017    Michelle Fisher    MRN: [id number]           Patient Information     Date Of Birth          1985        Visit Information        Provider Department      12/12/2017 3:30 PM Ramirez Lama MD M [facility] Ear Nose and Throat        Today's Diagnoses     Temporal encephalocele (H)    -  1       Follow-ups after your visit        Your next 10 appointments already scheduled     Jan 11, 2018   Procedure with Ramirez Lama MD   [facility], [place], Same Day Surgery (--)    [address] [facility] [address]   [phone]            Jan 23, 2018  2:00 PM CST   (Arrive by 1:45 PM)   RETURN CRANIOFACIAL SKULL BASE with MD JEAN Soria [facility] Ear Nose and Throat ([facility])    [address] [place] [id number] [phone]            Jan 23, 2018  2:00 PM CST   (Arrive by 1:45 PM)   RETURN CRANIOFACIAL SKULL BASE with MD JEAN Mohan [facility] Ear Nose and Throat [facility])    [address] [place] [address] [phone]              Who to contact     Please call your clinic at [phone] to:    Ask questions about your health    Make or cancel appointments    Discuss your medicines    Learn about your test results    Speak to your doctor   If you have compliments or concerns about an experience at your clinic, or if you wish to file a complaint, please contact [facility] Patient Relations at [phone] or email us at [email or web address]         Additional Information About Your Visit        MyChart Information     Montrue Technologieshart gives you secure access to your electronic health record. If you see a primary care provider, you can also send messages to your care team and make appointments. If you have questions, please call your primary care clinic.  If you do not have a primary care provider, please  call [phone] and they will assist you.      TerraPass is an electronic gateway that provides easy, online access to your medical records. With TerraPass, you can request a clinic appointment, read your test results, renew a prescription or communicate with your care team.     To access your existing account, please contact your [facility] or call [phone] for assistance.        Care EveryWhere ID     This is your Care EveryWhere ID. This could be used by other organizations to access your [place] medical records  [id number]        Your Vitals Were     Last Period                   11/27/2017 (Exact Date)            Blood Pressure from Last 3 Encounters:   12/12/17 111/76    Weight from Last 3 Encounters:   12/12/17 55.8 kg (123 lb)   12/12/17 55.8 kg (123 lb 1.6 oz)              Today, you had the following     No orders found for display       Primary Care Provider    None Specified       No primary provider on file.        Equal Access to Services     HARRIET [facility] : Hadii tonia machucao Sosesar, waaxda luqadaha, qaybta kaalmada adejaimie, aleisha cesar . So [facility] [phone].    ATENCIÓN: Si habla español, tiene a terry disposición servicios gratuitos de asistencia lingüística. Llame al [phone].    We comply with applicable federal civil rights laws and [place] laws. We do not discriminate on the basis of race, color, national origin, age, disability, sex, sexual orientation, or gender identity.            Thank you!     Thank you for choosing [facility] EAR NOSE AND THROAT  for your care. Our goal is always to provide you with excellent care. Hearing back from our patients is one way we can continue to improve our services. Please take a few minutes to complete the written survey that you may receive in the mail after your visit with us. Thank you!             Your Updated Medication List - Protect others around you: Learn how to safely use,  store and throw away your medicines at www.disposemymeds.org.          This list is accurate as of: 12/12/17 11:59 PM.  Always use your most recent med list.                   Brand Name Dispense Instructions for use Diagnosis    PROZAC 10 MG capsule   Generic drug:  FLUoxetine      Take 5 mg by mouth daily

## 2017-12-12 NOTE — LETTER
12/12/2017       RE: Michelle Fisher  [address]  [facility] [address]     Dear Colleague,    Thank you for referring your patient, Michelle Fisher, to the [facility] EAR NOSE AND THROAT at [facility]. Please see a copy of my visit note below.    HISTORY OF PRESENT ILLNESS: I had the pleasure of seeing Michelle Fisher today in followup along with my colleague, Dr. Ramirez Lama, in the [facility].  She is planning to undergo repair of a right middle ear and mastoid encephalocele in the beginning of the New Year and returned today to discuss this with me and Dr. Lama.      Since her last appointment, she has not developed any drainage from her nose or from her ear.  She has ongoing conductive hearing loss on this side.  She does not have other symptoms clearly attributable to the encephalocele.         AUDIOGRAM: Dr. Lama and I reviewed the audiogram.  She has a near maximal conductive loss in the right ear in the low frequencies, which becomes more symmetric in the high frequencies with her left ear.  The tympanogram is flat, consistent with the presence of brain tissue behind the tympanic membrane.      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  Additionally, mastoid air cells are opacified.  We compared this with an MRI scan where there is likely encephalocele around the middle ear itself and then CSF density that could also be fluid of another nature filling the mastoid air cells.  Based on the CT, there is some dehiscence potentially of the geniculate ganglion and based on our known physical examination, it shows encephalocele behind the tympanic membrane on the  otomicroscopic examination.  This all seems to be consistent with a deeply insinuated encephalocele within the middle ear space.      IMPRESSION AND PLAN:  We discussed the surgical approach and indications for the repair of right temporal lobe encephalocele.  We went from the beginning of the surgery all the way to the recovery, describing the incision and craniotomy and need for a lumbar drain, Matisol and seizure prophylaxis.  I discussed the risks from otologic standpoint including failure to alleviate the conductive loss that is present, introduction of a new sensorineural loss, dizziness or facial nerve paralysis, all of which could be permanent or temporary, and Dr. Lama also reviewed the risk of meningitis, seizures, stroke, neurologic compromise and recurrence of the disorder.  She and her mom asked a number of insightful questions about the postoperative course and indicated that they would like to proceed with this.  They are going to ensure that they have appropriate plans in place.  She has received one of her vaccinations for Prevnar and now they need the Pneumovax and she will continue with this.  I will see her back if she has further questions, but otherwise they are planning to proceed to the operating room at the beginning of January.      Thirty minutes were spent counseling and coordinating care today regarding the management of the encephalocele.      MA/ms         Again, thank you for allowing me to participate in the care of your patient.      Sincerely,    Alexia Oglesby MD

## 2017-12-12 NOTE — H&P
Pre-Operative H & P     CC:  Preoperative exam to assess for increased cardiopulmonary risk while undergoing surgery and anesthesia.    Date of Encounter: 12/12/2017  Primary Care Physician:  No primary care provider on file.    MADELIN Fisher is a 32 year old female who presents for pre-operative H & P in preparation for  Lumbar Drain Placement, Middle Cranial Fossa Encephalocele   latex safe  on 1/11/18 by Dr. Lama and Dr. Oglesby in treatment of encephalocele. Surgery at [facility].  History of right ear hearing loss.  Sees Dr. Lama in clinic later today.  No imaging in EPIC.     History is obtained from the patient, mother and electronic health record.     Past Medical History  Past Medical History:   Diagnosis Date     Conductive hearing loss 01/01/2017     Depressive disorder 01/01/2017     Encephalocele (H)      Migraines 01/01/2015     Recurrent otitis media 01/01/2016       Past Surgical History:  NONE    Hx of Blood transfusions/reactions: no     Hx of abnormal bleeding or anti-platelet use: no    Menstrual history: Patient's last menstrual period was 11/27/2017 (exact date).:     Steroid use in the last year: no    Personal or FH with difficulty with Anesthesia:  no    Prior to Admission Medications  Current Outpatient Prescriptions   Medication Sig Dispense Refill     FLUoxetine (PROZAC) 10 MG capsule Take 5 mg by mouth daily          Allergies  No Known Allergies    Social History  Social History     Social History     Marital status: Single     Spouse name: N/A     Number of children: N/A     Years of education: N/A     Occupational History     Not on file.     Social History Main Topics     Smoking status: Never Smoker     Smokeless tobacco: Never Used     Alcohol use Yes      Comment: occasional - holidays and birthday.      Drug use: No     Sexual activity: Not Currently     Partners: Male     Birth control/ protection: None     Other Topics  "Concern     Not on file     Social History Narrative    .  Lives with .    Works at [employer].    No children.  G0       Family History  Family History   Problem Relation Age of Onset     CANCER Maternal Grandmother      Hypertension Maternal Grandmother      Thyroid Disease Maternal Grandmother      Stomach Problem Maternal Grandmother      ulcers     HEART DISEASE Maternal Grandfather      Hypertension Maternal Grandfather      Substance Abuse Maternal Grandfather      Hypertension Mother      and high cholesterol     Depression Mother      Migraines Mother      CEREBROVASCULAR DISEASE Paternal Grandfather      CEREBROVASCULAR DISEASE Paternal Grandmother      MENTAL ILLNESS Paternal Grandmother      Other - See Comments Father      High cholesterol   No family history of bleeding, clotting disorders or complications with anesthesia.    ROS/MED HISTORY  The complete review of systems is negative other than noted in the HPI or here.     ENT/Pulmonary:  - neg pulmonary ROS    (-) tobacco use   Neurologic:     (+)migraines, other neuro right encephalocele   (-) seizures   Cardiovascular:  - neg cardiovascular ROS   (+) ----. : . . . :. . No previous cardiac testing       METS/Exercise Tolerance: Comment: Can walk 5 miles.  On feet all day at work.  >4 METS   Hematologic:  - neg hematologic  ROS       Musculoskeletal:  - neg musculoskeletal ROS       GI/Hepatic:  - neg GI/hepatic ROS       Renal/Genitourinary:  - ROS Renal section negative       Endo:  - neg endo ROS       Psychiatric:     (+) psychiatric history depression      Infectious Disease:  - neg infectious disease ROS       Malignancy:      - no malignancy   Other:    (+) Possibly pregnant LMP: Nov 27, 2017,        Temp: 97.7  F (36.5  C)   BP: 111/76 Pulse: 76     SpO2: 99 %         123 lbs 1.6 oz  5' 0\"   Body mass index is 24.04 kg/(m^2).       Physical Exam  Constitutional: Awake, alert, cooperative, no apparent distress, and appears " stated age.  Well nourished.   Eyes: Pupils equal, round and reactive to light,  sclera clear, conjunctiva normal.  HENT: Normocephalic, oral pharynx with moist mucus membranes, good dentition. No goiter appreciated.   Respiratory: Clear to auscultation bilaterally, no crackles or wheezing.  Cardiovascular: Regular rate and rhythm, normal S1 and S2, and no murmur noted.  Carotids +2, no bruits. No edema. Palpable pulses to DP and PT arteries.   GI: Normal bowel sounds, soft, non-distended, non-tender, no masses palpated, no hepatosplenomegaly.    Lymph/Hematologic: No cervical lymphadenopathy and no supraclavicular lymphadenopathy.  Skin: Warm and dry.  No rashes at anticipated surgical site.   Musculoskeletal: Full ROM of neck. There is no redness, warmth, or swelling of the joints. Gross motor strength is normal.    Neurologic: Awake, alert, oriented to name, place and time. Cranial nerves II-XII are grossly intact. Gait is normal.   Neuropsychiatric: Calm, cooperative. Normal affect.     Labs: (personally reviewed)  12/12/2017 14:57  Sodium: 139  Potassium: 3.6  Chloride: 106  Carbon Dioxide: 30  Urea Nitrogen: 11  Creatinine: 0.75  GFR Estimate: 89  GFR Estimate If Black: >90  Calcium: 8.6  Anion Gap: 4  Glucose: 90    WBC: 6.0  Hemoglobin: 13.6  Hematocrit: 41.4  Platelet Count: 259  RBC Count: 4.45  MCV: 93  MCH: 30.6  MCHC: 32.9  RDW: 11.9    INR: 1.00  PTT: 37    No imaging in computer.  Dr. Oglesby notes discuss imaging results.     Outside records reviewed from: NA    ASSESSMENT and PLAN  Michelle Fisher is a 32 year old female scheduled to undergo  Lumbar Drain Placement, Middle Cranial Fossa Encephalocele   latex safe  on 1/11/18 by Dr. Lama and Dr. Oglesby in treatment of encephalocele.  PAC referral for risk assessment and optimization for anesthesia with comorbid conditions of:  **1st time having surgery    Pre-operative considerations:  1.  Cardiac:  Functional status very good.  Can walk miles. 0.4%   risk of major adverse cardiac event.  Risk of MACE < 1%. METS>4.  No further cardiac evaluation needed per 2014 ACC/AHA guidelines for non-cardiac surgery.  2.  Pulm:  Airway feasible.  HOMER risk: low.  Non smoker.   3.  GI:  Risk of PONV score = 3.  If 3 or > anti-emetic intervention recommended (with 2 or more meds).   4.  Neuro:  Encephalocele, right ear hearing loss. History of migraines and vertigo.     .  Patient is optimized and is acceptable candidate for the proposed procedure.  No further diagnostic evaluation is needed.  For complete medication and diet instructions for surgery, please refer to the AVS completed by nursing.     Patient was discussed with Dr Marquez.    Kenna Arias PA-C  Preoperative Assessment Center  [facility]  Clinic and Surgery Center  Phone: [phone]  Fax: [phone]

## 2017-12-13 NOTE — PROGRESS NOTES
Ms. Fisher is seen today at Dr. 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      Michelle was found to have a mass in the middle ear and was referred to Dr. Oglesby.  Imaging including MRI and CT confirmed dehiscence in the tegmen tympani and soft tissue mass in the middle ear surrounding the ossicles consistent with a temporal hernia.  There is some fluid in the mastoid air cells on that side as well.      She has no evidence of external CSF leakage and has not had meningitis.      PHYSICAL EXAMINATION:  On examination, she has normal facial nerve function.  She has a significant conductive hearing loss on the right side consistent with the soft tissue mass in her middle ear.      We reviewed the imaging and it is as described.  There is a fairly extensive defect in the floor of the middle fossa.  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      RECOMMENDATIONS:  We have discussed with Michelle and her mother the fact that an operation at this point would be preventive.  The goal will be to remove the soft tissue mass in the middle ear and hopefully improve hearing and to repair any openings in the dura and the middle fossa floor so as to prevent progression of the herniation and eventual overt CSF leakage.      I discussed with them the nature of the temporal craniotomy, the use of a lumbar spinal drainage and osmotic diuresis to shrink the brain and intradural volume so that we can safely separate the dura from the middle fossa floor so that Dr. Oglesby can remove the herniated tissue and we can repair the middle fossa floor with a titanium plate and dural substitutes.  We would close the dura either primarily or with dural substitute as necessary.      She understands the risks  of seizure, subdural and epidural hematoma, the remote risks of brain injury.  She understands the risk of CSF leakage following the procedure, the low risk of infection from the operation, and the possibility that the repair could fail.      She and her mother asked a number of excellent questions, which we answered.  She is presently on the OR schedule for .         ALVIN CRUZ MD             D: 2017 18:32   T: 2017 14:19   MT: CRISTIANO      Name:     EUN CASTILLO   MRN:      [id number]        Account:      [id number]   :      1985           Service Date: 2017      Document: [id number]

## 2017-12-13 NOTE — PROGRESS NOTES
HISTORY OF PRESENT ILLNESS: I had the pleasure of seeing Michelle Fisher today in followup along with my colleague, Dr. Ramirez Lama, in the [facility].  She is planning to undergo repair of a right middle ear and mastoid encephalocele in the beginning of the New Year and returned today to discuss this with me and Dr. Lama.      Since her last appointment, she has not developed any drainage from her nose or from her ear.  She has ongoing conductive hearing loss on this side.  She does not have other symptoms clearly attributable to the encephalocele.         AUDIOGRAM: Dr. Lama and I reviewed the audiogram.  She has a near maximal conductive loss in the right ear in the low frequencies, which becomes more symmetric in the high frequencies with her left ear.  The tympanogram is flat, consistent with the presence of brain tissue behind the tympanic membrane.      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  Additionally, mastoid air cells are opacified.  We compared this with an MRI scan where there is likely encephalocele around the middle ear itself and then CSF density that could also be fluid of another nature filling the mastoid air cells.  Based on the CT, there is some dehiscence potentially of the geniculate ganglion and based on our known physical examination, it shows encephalocele behind the tympanic membrane on the otomicroscopic examination.  This all seems to be consistent with a deeply insinuated encephalocele within the middle ear space.      IMPRESSION AND PLAN:  We discussed the surgical approach and indications for the repair of right temporal lobe encephalocele.  We went from the beginning  of the surgery all the way to the recovery, describing the incision and craniotomy and need for a lumbar drain, Matisol and seizure prophylaxis.  I discussed the risks from otologic standpoint including failure to alleviate the conductive loss that is present, introduction of a new sensorineural loss, dizziness or facial nerve paralysis, all of which could be permanent or temporary, and Dr. Lama also reviewed the risk of meningitis, seizures, stroke, neurologic compromise and recurrence of the disorder.  She and her mom asked a number of insightful questions about the postoperative course and indicated that they would like to proceed with this.  They are going to ensure that they have appropriate plans in place.  She has received one of her vaccinations for Prevnar and now they need the Pneumovax and she will continue with this.  I will see her back if she has further questions, but otherwise they are planning to proceed to the operating room at the beginning of January.      Thirty minutes were spent counseling and coordinating care today regarding the management of the encephalocele.      MA/ms

## 2017-12-18 PROBLEM — Q01.8 TEMPORAL ENCEPHALOCELE (H): Status: ACTIVE | Noted: 2017-12-18

## 2018-01-10 ENCOUNTER — ANESTHESIA EVENT (OUTPATIENT)
Dept: SURGERY | Facility: CLINIC | Age: 33
DRG: 027 | End: 2018-01-10
Payer: COMMERCIAL

## 2018-01-11 ENCOUNTER — ANESTHESIA (OUTPATIENT)
Dept: SURGERY | Facility: CLINIC | Age: 33
DRG: 027 | End: 2018-01-11
Payer: COMMERCIAL

## 2018-01-11 ENCOUNTER — HOSPITAL ENCOUNTER (INPATIENT)
Facility: CLINIC | Age: 33
LOS: 4 days | Discharge: HOME | DRG: 027 | End: 2018-01-15
Attending: OTOLARYNGOLOGY | Admitting: NEUROLOGICAL SURGERY
Payer: COMMERCIAL

## 2018-01-11 DIAGNOSIS — R53.81 PHYSICAL DECONDITIONING: ICD-10-CM

## 2018-01-11 DIAGNOSIS — Q01.9 ENCEPHALOCELE (H): ICD-10-CM

## 2018-01-11 DIAGNOSIS — Q01.8 TEMPORAL ENCEPHALOCELE (H): Primary | ICD-10-CM

## 2018-01-11 LAB
ABO + RH BLD: NORMAL
ABO + RH BLD: NORMAL
ANION GAP SERPL CALCULATED.3IONS-SCNC: 7 MMOL/L (ref 3–14)
ANION GAP SERPL CALCULATED.3IONS-SCNC: 8 MMOL/L (ref 3–14)
BASE DEFICIT BLDA-SCNC: 1.5 MMOL/L
BASE DEFICIT BLDA-SCNC: 1.7 MMOL/L
BLD GP AB SCN SERPL QL: NORMAL
BLOOD BANK CMNT PATIENT-IMP: NORMAL
BLOOD BANK CMNT PATIENT-IMP: NORMAL
BUN SERPL-MCNC: 9 MG/DL (ref 7–30)
BUN SERPL-MCNC: 9 MG/DL (ref 7–30)
CA-I BLD-MCNC: 4.4 MG/DL (ref 4.4–5.2)
CA-I BLD-MCNC: 4.6 MG/DL (ref 4.4–5.2)
CALCIUM SERPL-MCNC: 7.6 MG/DL (ref 8.5–10.1)
CALCIUM SERPL-MCNC: 7.9 MG/DL (ref 8.5–10.1)
CHLORIDE SERPL-SCNC: 109 MMOL/L (ref 94–109)
CHLORIDE SERPL-SCNC: 111 MMOL/L (ref 94–109)
CO2 SERPL-SCNC: 24 MMOL/L (ref 20–32)
CO2 SERPL-SCNC: 24 MMOL/L (ref 20–32)
CREAT SERPL-MCNC: 0.65 MG/DL (ref 0.52–1.04)
CREAT SERPL-MCNC: 0.65 MG/DL (ref 0.52–1.04)
GFR SERPL CREATININE-BSD FRML MDRD: >90 ML/MIN/1.7M2
GFR SERPL CREATININE-BSD FRML MDRD: >90 ML/MIN/1.7M2
GLUCOSE BLD-MCNC: 102 MG/DL (ref 70–99)
GLUCOSE BLD-MCNC: 136 MG/DL (ref 70–99)
GLUCOSE BLDC GLUCOMTR-MCNC: 96 MG/DL (ref 70–99)
GLUCOSE SERPL-MCNC: 106 MG/DL (ref 70–99)
GLUCOSE SERPL-MCNC: 135 MG/DL (ref 70–99)
HCG UR QL: NEGATIVE
HCO3 BLD-SCNC: 23 MMOL/L (ref 21–28)
HCO3 BLD-SCNC: 23 MMOL/L (ref 21–28)
HGB BLD-MCNC: 11.1 G/DL (ref 11.7–15.7)
HGB BLD-MCNC: 12.8 G/DL (ref 11.7–15.7)
MRSA DNA SPEC QL NAA+PROBE: NEGATIVE
O2/TOTAL GAS SETTING VFR VENT: 40 %
O2/TOTAL GAS SETTING VFR VENT: 40 %
OSMOLALITY SERPL: 285 MMOL/KG (ref 275–295)
OSMOLALITY SERPL: 294 MMOL/KG (ref 275–295)
PCO2 BLD: 36 MM HG (ref 35–45)
PCO2 BLD: 37 MM HG (ref 35–45)
PH BLD: 7.4 PH (ref 7.35–7.45)
PH BLD: 7.41 PH (ref 7.35–7.45)
PO2 BLD: 180 MM HG (ref 80–105)
PO2 BLD: 183 MM HG (ref 80–105)
POTASSIUM BLD-SCNC: 3.2 MMOL/L (ref 3.4–5.3)
POTASSIUM BLD-SCNC: 3.5 MMOL/L (ref 3.4–5.3)
POTASSIUM SERPL-SCNC: 3.3 MMOL/L (ref 3.4–5.3)
POTASSIUM SERPL-SCNC: 3.7 MMOL/L (ref 3.4–5.3)
SODIUM BLD-SCNC: 137 MMOL/L (ref 133–144)
SODIUM BLD-SCNC: 141 MMOL/L (ref 133–144)
SODIUM SERPL-SCNC: 141 MMOL/L (ref 133–144)
SODIUM SERPL-SCNC: 142 MMOL/L (ref 133–144)
SPECIMEN EXP DATE BLD: NORMAL
SPECIMEN SOURCE: NORMAL

## 2018-01-11 PROCEDURE — 0NU60JZ SUPPLEMENT LEFT TEMPORAL BONE WITH SYNTHETIC SUBSTITUTE, OPEN APPROACH: ICD-10-PCS | Performed by: NEUROLOGICAL SURGERY

## 2018-01-11 PROCEDURE — 40000014 ZZH STATISTIC ARTERIAL MONITORING DAILY

## 2018-01-11 PROCEDURE — 25000128 H RX IP 250 OP 636: Performed by: NEUROLOGICAL SURGERY

## 2018-01-11 PROCEDURE — 40000275 ZZH STATISTIC RCP TIME EA 10 MIN

## 2018-01-11 PROCEDURE — 25000128 H RX IP 250 OP 636

## 2018-01-11 PROCEDURE — 009U30Z DRAINAGE OF SPINAL CANAL WITH DRAINAGE DEVICE, PERCUTANEOUS APPROACH: ICD-10-PCS | Performed by: NEUROLOGICAL SURGERY

## 2018-01-11 PROCEDURE — C9399 UNCLASSIFIED DRUGS OR BIOLOG: HCPCS

## 2018-01-11 PROCEDURE — 83930 ASSAY OF BLOOD OSMOLALITY: CPT | Performed by: OTOLARYNGOLOGY

## 2018-01-11 PROCEDURE — 84132 ASSAY OF SERUM POTASSIUM: CPT | Performed by: ANESTHESIOLOGY

## 2018-01-11 PROCEDURE — 82803 BLOOD GASES ANY COMBINATION: CPT | Performed by: ANESTHESIOLOGY

## 2018-01-11 PROCEDURE — C1762 CONN TISS, HUMAN(INC FASCIA): HCPCS | Performed by: NEUROLOGICAL SURGERY

## 2018-01-11 PROCEDURE — 37000008 ZZH ANESTHESIA TECHNICAL FEE, 1ST 30 MIN: Performed by: NEUROLOGICAL SURGERY

## 2018-01-11 PROCEDURE — 80048 BASIC METABOLIC PNL TOTAL CA: CPT | Performed by: ANESTHESIOLOGY

## 2018-01-11 PROCEDURE — 36000074 ZZH SURGERY LEVEL 6 1ST 30 MIN - UMMC: Performed by: NEUROLOGICAL SURGERY

## 2018-01-11 PROCEDURE — 82947 ASSAY GLUCOSE BLOOD QUANT: CPT | Performed by: ANESTHESIOLOGY

## 2018-01-11 PROCEDURE — 25000128 H RX IP 250 OP 636: Performed by: NURSE PRACTITIONER

## 2018-01-11 PROCEDURE — 71000014 ZZH RECOVERY PHASE 1 LEVEL 2 FIRST HR: Performed by: NEUROLOGICAL SURGERY

## 2018-01-11 PROCEDURE — 81025 URINE PREGNANCY TEST: CPT | Performed by: NEUROLOGICAL SURGERY

## 2018-01-11 PROCEDURE — 80048 BASIC METABOLIC PNL TOTAL CA: CPT | Performed by: OTOLARYNGOLOGY

## 2018-01-11 PROCEDURE — 82330 ASSAY OF CALCIUM: CPT | Performed by: ANESTHESIOLOGY

## 2018-01-11 PROCEDURE — 20000004 ZZH R&B ICU UMMC

## 2018-01-11 PROCEDURE — 00000146 ZZHCL STATISTIC GLUCOSE BY METER IP

## 2018-01-11 PROCEDURE — 87640 STAPH A DNA AMP PROBE: CPT | Performed by: NEUROLOGICAL SURGERY

## 2018-01-11 PROCEDURE — 36000076 ZZH SURGERY LEVEL 6 EA 15 ADDTL MIN - UMMC: Performed by: NEUROLOGICAL SURGERY

## 2018-01-11 PROCEDURE — 25000125 ZZHC RX 250: Performed by: NEUROLOGICAL SURGERY

## 2018-01-11 PROCEDURE — 37000009 ZZH ANESTHESIA TECHNICAL FEE, EACH ADDTL 15 MIN: Performed by: NEUROLOGICAL SURGERY

## 2018-01-11 PROCEDURE — 25000128 H RX IP 250 OP 636: Performed by: STUDENT IN AN ORGANIZED HEALTH CARE EDUCATION/TRAINING PROGRAM

## 2018-01-11 PROCEDURE — 83930 ASSAY OF BLOOD OSMOLALITY: CPT | Performed by: ANESTHESIOLOGY

## 2018-01-11 PROCEDURE — 25000564 ZZH DESFLURANE, EA 15 MIN: Performed by: NEUROLOGICAL SURGERY

## 2018-01-11 PROCEDURE — 25000132 ZZH RX MED GY IP 250 OP 250 PS 637

## 2018-01-11 PROCEDURE — 27210995 ZZH RX 272: Performed by: NEUROLOGICAL SURGERY

## 2018-01-11 PROCEDURE — C1781 MESH (IMPLANTABLE): HCPCS | Performed by: NEUROLOGICAL SURGERY

## 2018-01-11 PROCEDURE — 71000015 ZZH RECOVERY PHASE 1 LEVEL 2 EA ADDTL HR: Performed by: NEUROLOGICAL SURGERY

## 2018-01-11 PROCEDURE — 00Q10ZZ REPAIR CEREBRAL MENINGES, OPEN APPROACH: ICD-10-PCS | Performed by: NEUROLOGICAL SURGERY

## 2018-01-11 PROCEDURE — C1713 ANCHOR/SCREW BN/BN,TIS/BN: HCPCS | Performed by: NEUROLOGICAL SURGERY

## 2018-01-11 PROCEDURE — 87641 MR-STAPH DNA AMP PROBE: CPT | Performed by: NEUROLOGICAL SURGERY

## 2018-01-11 PROCEDURE — 27210794 ZZH OR GENERAL SUPPLY STERILE: Performed by: NEUROLOGICAL SURGERY

## 2018-01-11 PROCEDURE — 25000128 H RX IP 250 OP 636: Performed by: OTOLARYNGOLOGY

## 2018-01-11 PROCEDURE — 25000132 ZZH RX MED GY IP 250 OP 250 PS 637: Performed by: NEUROLOGICAL SURGERY

## 2018-01-11 PROCEDURE — 25000125 ZZHC RX 250

## 2018-01-11 PROCEDURE — 40000170 ZZH STATISTIC PRE-PROCEDURE ASSESSMENT II: Performed by: NEUROLOGICAL SURGERY

## 2018-01-11 PROCEDURE — 84295 ASSAY OF SERUM SODIUM: CPT | Performed by: ANESTHESIOLOGY

## 2018-01-11 PROCEDURE — 00U10JZ SUPPLEMENT CEREBRAL MENINGES WITH SYNTHETIC SUBSTITUTE, OPEN APPROACH: ICD-10-PCS | Performed by: OTOLARYNGOLOGY

## 2018-01-11 DEVICE — GRAFT DUREPAIR DURA MATRIX 2X2" 62100: Type: IMPLANTABLE DEVICE | Site: BRAIN | Status: FUNCTIONAL

## 2018-01-11 DEVICE — IMP PLATE SYN BOX LOW PROFILE 04H TI 421.511: Type: IMPLANTABLE DEVICE | Site: CRANIAL | Status: FUNCTIONAL

## 2018-01-11 DEVICE — IMP MESH SYN MALLEABLE 38X45MM LOW PROFILER TI: Type: IMPLANTABLE DEVICE | Site: CRANIAL | Status: FUNCTIONAL

## 2018-01-11 DEVICE — IMP SCR SYN MATRIX LOW PRO 1.5X04MM SELF DRILL 04.503.104.01: Type: IMPLANTABLE DEVICE | Site: CRANIAL | Status: FUNCTIONAL

## 2018-01-11 DEVICE — IMP PLATE SYN STR DOG BONE LOW PROFILE 2H TI 421.502: Type: IMPLANTABLE DEVICE | Site: CRANIAL | Status: FUNCTIONAL

## 2018-01-11 RX ORDER — FENTANYL CITRATE 50 UG/ML
25-50 INJECTION, SOLUTION INTRAMUSCULAR; INTRAVENOUS
Status: DISCONTINUED | OUTPATIENT
Start: 2018-01-11 | End: 2018-01-11 | Stop reason: HOSPADM

## 2018-01-11 RX ORDER — LEVETIRACETAM 100 MG/ML
SOLUTION ORAL PRN
Status: DISCONTINUED | OUTPATIENT
Start: 2018-01-11 | End: 2018-01-12

## 2018-01-11 RX ORDER — MAGNESIUM SULFATE HEPTAHYDRATE 40 MG/ML
4 INJECTION, SOLUTION INTRAVENOUS EVERY 4 HOURS PRN
Status: DISCONTINUED | OUTPATIENT
Start: 2018-01-11 | End: 2018-01-15 | Stop reason: HOSPADM

## 2018-01-11 RX ORDER — METOCLOPRAMIDE HYDROCHLORIDE 5 MG/ML
10 INJECTION INTRAMUSCULAR; INTRAVENOUS EVERY 6 HOURS PRN
Status: DISCONTINUED | OUTPATIENT
Start: 2018-01-11 | End: 2018-01-15 | Stop reason: HOSPADM

## 2018-01-11 RX ORDER — POTASSIUM CHLORIDE 750 MG/1
20-40 TABLET, EXTENDED RELEASE ORAL
Status: DISCONTINUED | OUTPATIENT
Start: 2018-01-11 | End: 2018-01-15 | Stop reason: HOSPADM

## 2018-01-11 RX ORDER — FLUOXETINE 20 MG/5ML
5 SOLUTION ORAL DAILY
Status: DISCONTINUED | OUTPATIENT
Start: 2018-01-11 | End: 2018-01-15 | Stop reason: HOSPADM

## 2018-01-11 RX ORDER — ONDANSETRON 2 MG/ML
4 INJECTION INTRAMUSCULAR; INTRAVENOUS EVERY 6 HOURS PRN
Status: DISCONTINUED | OUTPATIENT
Start: 2018-01-11 | End: 2018-01-15 | Stop reason: HOSPADM

## 2018-01-11 RX ORDER — LIDOCAINE 40 MG/G
CREAM TOPICAL
Status: DISCONTINUED | OUTPATIENT
Start: 2018-01-11 | End: 2018-01-15 | Stop reason: HOSPADM

## 2018-01-11 RX ORDER — POTASSIUM CHLORIDE 7.45 MG/ML
10 INJECTION INTRAVENOUS
Status: DISCONTINUED | OUTPATIENT
Start: 2018-01-11 | End: 2018-01-11 | Stop reason: RX

## 2018-01-11 RX ORDER — LIDOCAINE HYDROCHLORIDE AND EPINEPHRINE 10; 10 MG/ML; UG/ML
INJECTION, SOLUTION INFILTRATION; PERINEURAL PRN
Status: DISCONTINUED | OUTPATIENT
Start: 2018-01-11 | End: 2018-01-11 | Stop reason: HOSPADM

## 2018-01-11 RX ORDER — OXYCODONE HYDROCHLORIDE 5 MG/1
5-10 TABLET ORAL
Status: DISCONTINUED | OUTPATIENT
Start: 2018-01-11 | End: 2018-01-15 | Stop reason: HOSPADM

## 2018-01-11 RX ORDER — ONDANSETRON 4 MG/1
4 TABLET, ORALLY DISINTEGRATING ORAL EVERY 6 HOURS PRN
Status: DISCONTINUED | OUTPATIENT
Start: 2018-01-11 | End: 2018-01-15 | Stop reason: HOSPADM

## 2018-01-11 RX ORDER — POTASSIUM CL/LIDO/0.9 % NACL 10MEQ/0.1L
10 INTRAVENOUS SOLUTION, PIGGYBACK (ML) INTRAVENOUS
Status: DISCONTINUED | OUTPATIENT
Start: 2018-01-11 | End: 2018-01-15 | Stop reason: HOSPADM

## 2018-01-11 RX ORDER — SODIUM CHLORIDE 9 MG/ML
INJECTION, SOLUTION INTRAVENOUS CONTINUOUS
Status: DISCONTINUED | OUTPATIENT
Start: 2018-01-11 | End: 2018-01-15 | Stop reason: HOSPADM

## 2018-01-11 RX ORDER — LEVETIRACETAM 500 MG/1
1000 TABLET ORAL 2 TIMES DAILY
Status: DISCONTINUED | OUTPATIENT
Start: 2018-01-11 | End: 2018-01-11

## 2018-01-11 RX ORDER — LIDOCAINE HYDROCHLORIDE 20 MG/ML
INJECTION, SOLUTION INFILTRATION; PERINEURAL PRN
Status: DISCONTINUED | OUTPATIENT
Start: 2018-01-11 | End: 2018-01-12

## 2018-01-11 RX ORDER — LEVETIRACETAM 10 MG/ML
1000 INJECTION INTRAVASCULAR EVERY 12 HOURS
Status: DISCONTINUED | OUTPATIENT
Start: 2018-01-11 | End: 2018-01-11

## 2018-01-11 RX ORDER — EPHEDRINE SULFATE 50 MG/ML
INJECTION, SOLUTION INTRAMUSCULAR; INTRAVENOUS; SUBCUTANEOUS PRN
Status: DISCONTINUED | OUTPATIENT
Start: 2018-01-11 | End: 2018-01-12

## 2018-01-11 RX ORDER — LEVETIRACETAM 500 MG/1
1000 TABLET ORAL EVERY 12 HOURS
Status: DISCONTINUED | OUTPATIENT
Start: 2018-01-11 | End: 2018-01-15 | Stop reason: HOSPADM

## 2018-01-11 RX ORDER — NALOXONE HYDROCHLORIDE 0.4 MG/ML
.1-.4 INJECTION, SOLUTION INTRAMUSCULAR; INTRAVENOUS; SUBCUTANEOUS
Status: DISCONTINUED | OUTPATIENT
Start: 2018-01-11 | End: 2018-01-15 | Stop reason: HOSPADM

## 2018-01-11 RX ORDER — ACETAMINOPHEN 325 MG/1
975 TABLET ORAL EVERY 8 HOURS
Status: DISPENSED | OUTPATIENT
Start: 2018-01-11 | End: 2018-01-14

## 2018-01-11 RX ORDER — HYDROMORPHONE HYDROCHLORIDE 1 MG/ML
.3-.5 INJECTION, SOLUTION INTRAMUSCULAR; INTRAVENOUS; SUBCUTANEOUS
Status: DISCONTINUED | OUTPATIENT
Start: 2018-01-11 | End: 2018-01-15 | Stop reason: HOSPADM

## 2018-01-11 RX ORDER — POTASSIUM CHLORIDE 29.8 MG/ML
20 INJECTION INTRAVENOUS
Status: DISCONTINUED | OUTPATIENT
Start: 2018-01-11 | End: 2018-01-15 | Stop reason: HOSPADM

## 2018-01-11 RX ORDER — LIDOCAINE 40 MG/G
CREAM TOPICAL
Status: DISCONTINUED | OUTPATIENT
Start: 2018-01-11 | End: 2018-01-11 | Stop reason: HOSPADM

## 2018-01-11 RX ORDER — ONDANSETRON 2 MG/ML
4 INJECTION INTRAMUSCULAR; INTRAVENOUS EVERY 30 MIN PRN
Status: DISCONTINUED | OUTPATIENT
Start: 2018-01-11 | End: 2018-01-11 | Stop reason: HOSPADM

## 2018-01-11 RX ORDER — ONDANSETRON 4 MG/1
4 TABLET, ORALLY DISINTEGRATING ORAL EVERY 30 MIN PRN
Status: DISCONTINUED | OUTPATIENT
Start: 2018-01-11 | End: 2018-01-11 | Stop reason: HOSPADM

## 2018-01-11 RX ORDER — PROPOFOL 10 MG/ML
INJECTION, EMULSION INTRAVENOUS PRN
Status: DISCONTINUED | OUTPATIENT
Start: 2018-01-11 | End: 2018-01-12

## 2018-01-11 RX ORDER — SODIUM CHLORIDE, SODIUM LACTATE, POTASSIUM CHLORIDE, CALCIUM CHLORIDE 600; 310; 30; 20 MG/100ML; MG/100ML; MG/100ML; MG/100ML
INJECTION, SOLUTION INTRAVENOUS CONTINUOUS PRN
Status: DISCONTINUED | OUTPATIENT
Start: 2018-01-11 | End: 2018-01-11

## 2018-01-11 RX ORDER — DEXAMETHASONE SODIUM PHOSPHATE 4 MG/ML
4 INJECTION, SOLUTION INTRA-ARTICULAR; INTRALESIONAL; INTRAMUSCULAR; INTRAVENOUS; SOFT TISSUE
Status: DISCONTINUED | OUTPATIENT
Start: 2018-01-11 | End: 2018-01-11 | Stop reason: HOSPADM

## 2018-01-11 RX ORDER — LABETALOL HYDROCHLORIDE 5 MG/ML
10-40 INJECTION, SOLUTION INTRAVENOUS EVERY 10 MIN PRN
Status: DISCONTINUED | OUTPATIENT
Start: 2018-01-11 | End: 2018-01-15 | Stop reason: HOSPADM

## 2018-01-11 RX ORDER — AMOXICILLIN 250 MG
1-2 CAPSULE ORAL 2 TIMES DAILY
Status: DISCONTINUED | OUTPATIENT
Start: 2018-01-11 | End: 2018-01-15 | Stop reason: HOSPADM

## 2018-01-11 RX ORDER — MANNITOL 20 G/100ML
INJECTION, SOLUTION INTRAVENOUS PRN
Status: DISCONTINUED | OUTPATIENT
Start: 2018-01-11 | End: 2018-01-12

## 2018-01-11 RX ORDER — DEXAMETHASONE SODIUM PHOSPHATE 4 MG/ML
INJECTION, SOLUTION INTRA-ARTICULAR; INTRALESIONAL; INTRAMUSCULAR; INTRAVENOUS; SOFT TISSUE PRN
Status: DISCONTINUED | OUTPATIENT
Start: 2018-01-11 | End: 2018-01-12

## 2018-01-11 RX ORDER — ACETAMINOPHEN 325 MG/1
650 TABLET ORAL EVERY 4 HOURS PRN
Status: DISCONTINUED | OUTPATIENT
Start: 2018-01-14 | End: 2018-01-15 | Stop reason: HOSPADM

## 2018-01-11 RX ORDER — PROCHLORPERAZINE MALEATE 5 MG
10 TABLET ORAL EVERY 6 HOURS PRN
Status: DISCONTINUED | OUTPATIENT
Start: 2018-01-11 | End: 2018-01-15 | Stop reason: HOSPADM

## 2018-01-11 RX ORDER — LABETALOL HYDROCHLORIDE 5 MG/ML
10 INJECTION, SOLUTION INTRAVENOUS
Status: DISCONTINUED | OUTPATIENT
Start: 2018-01-11 | End: 2018-01-11 | Stop reason: HOSPADM

## 2018-01-11 RX ORDER — LEVETIRACETAM 10 MG/ML
1000 INJECTION INTRAVASCULAR ONCE
Status: DISCONTINUED | OUTPATIENT
Start: 2018-01-11 | End: 2018-01-11 | Stop reason: HOSPADM

## 2018-01-11 RX ORDER — FENTANYL CITRATE 50 UG/ML
INJECTION, SOLUTION INTRAMUSCULAR; INTRAVENOUS PRN
Status: DISCONTINUED | OUTPATIENT
Start: 2018-01-11 | End: 2018-01-12

## 2018-01-11 RX ORDER — SODIUM CHLORIDE, SODIUM LACTATE, POTASSIUM CHLORIDE, CALCIUM CHLORIDE 600; 310; 30; 20 MG/100ML; MG/100ML; MG/100ML; MG/100ML
INJECTION, SOLUTION INTRAVENOUS CONTINUOUS PRN
Status: DISCONTINUED | OUTPATIENT
Start: 2018-01-11 | End: 2018-01-12

## 2018-01-11 RX ORDER — NALOXONE HYDROCHLORIDE 0.4 MG/ML
.1-.4 INJECTION, SOLUTION INTRAMUSCULAR; INTRAVENOUS; SUBCUTANEOUS
Status: DISCONTINUED | OUTPATIENT
Start: 2018-01-11 | End: 2018-01-12

## 2018-01-11 RX ORDER — PROPOFOL 10 MG/ML
INJECTION, EMULSION INTRAVENOUS CONTINUOUS PRN
Status: DISCONTINUED | OUTPATIENT
Start: 2018-01-11 | End: 2018-01-12

## 2018-01-11 RX ORDER — POTASSIUM CHLORIDE 1.5 G/1.58G
20-40 POWDER, FOR SOLUTION ORAL
Status: DISCONTINUED | OUTPATIENT
Start: 2018-01-11 | End: 2018-01-15 | Stop reason: HOSPADM

## 2018-01-11 RX ORDER — HYDRALAZINE HYDROCHLORIDE 20 MG/ML
10-20 INJECTION INTRAMUSCULAR; INTRAVENOUS EVERY 30 MIN PRN
Status: DISCONTINUED | OUTPATIENT
Start: 2018-01-11 | End: 2018-01-15 | Stop reason: HOSPADM

## 2018-01-11 RX ORDER — METOCLOPRAMIDE 5 MG/1
10 TABLET ORAL EVERY 6 HOURS PRN
Status: DISCONTINUED | OUTPATIENT
Start: 2018-01-11 | End: 2018-01-15 | Stop reason: HOSPADM

## 2018-01-11 RX ORDER — SODIUM CHLORIDE, SODIUM LACTATE, POTASSIUM CHLORIDE, CALCIUM CHLORIDE 600; 310; 30; 20 MG/100ML; MG/100ML; MG/100ML; MG/100ML
INJECTION, SOLUTION INTRAVENOUS CONTINUOUS
Status: DISCONTINUED | OUTPATIENT
Start: 2018-01-11 | End: 2018-01-11 | Stop reason: HOSPADM

## 2018-01-11 RX ORDER — LEVETIRACETAM 10 MG/ML
1000 INJECTION INTRAVASCULAR EVERY 12 HOURS
Status: DISCONTINUED | OUTPATIENT
Start: 2018-01-11 | End: 2018-01-15 | Stop reason: CLARIF

## 2018-01-11 RX ORDER — ONDANSETRON 2 MG/ML
INJECTION INTRAMUSCULAR; INTRAVENOUS PRN
Status: DISCONTINUED | OUTPATIENT
Start: 2018-01-11 | End: 2018-01-12

## 2018-01-11 RX ADMIN — SUGAMMADEX 200 MG: 100 INJECTION, SOLUTION INTRAVENOUS at 10:46

## 2018-01-11 RX ADMIN — ONDANSETRON 4 MG: 2 INJECTION INTRAMUSCULAR; INTRAVENOUS at 13:42

## 2018-01-11 RX ADMIN — ROCURONIUM BROMIDE 20 MG: 10 INJECTION INTRAVENOUS at 10:27

## 2018-01-11 RX ADMIN — FENTANYL CITRATE 25 MCG: 50 INJECTION, SOLUTION INTRAMUSCULAR; INTRAVENOUS at 11:40

## 2018-01-11 RX ADMIN — Medication 5 MG: at 08:20

## 2018-01-11 RX ADMIN — PHENYLEPHRINE HYDROCHLORIDE 0.2 MCG/KG/MIN: 10 INJECTION, SOLUTION INTRAMUSCULAR; INTRAVENOUS; SUBCUTANEOUS at 12:21

## 2018-01-11 RX ADMIN — Medication 5 MG: at 08:15

## 2018-01-11 RX ADMIN — FENTANYL CITRATE 25 MCG: 50 INJECTION, SOLUTION INTRAMUSCULAR; INTRAVENOUS at 11:28

## 2018-01-11 RX ADMIN — SODIUM CHLORIDE, POTASSIUM CHLORIDE, SODIUM LACTATE AND CALCIUM CHLORIDE: 600; 310; 30; 20 INJECTION, SOLUTION INTRAVENOUS at 07:32

## 2018-01-11 RX ADMIN — FENTANYL CITRATE 50 MCG: 50 INJECTION, SOLUTION INTRAMUSCULAR; INTRAVENOUS at 10:34

## 2018-01-11 RX ADMIN — DEXAMETHASONE SODIUM PHOSPHATE 8 MG: 4 INJECTION, SOLUTION INTRA-ARTICULAR; INTRALESIONAL; INTRAMUSCULAR; INTRAVENOUS; SOFT TISSUE at 10:38

## 2018-01-11 RX ADMIN — FENTANYL CITRATE 50 MCG: 50 INJECTION, SOLUTION INTRAMUSCULAR; INTRAVENOUS at 09:55

## 2018-01-11 RX ADMIN — FENTANYL CITRATE 50 MCG: 50 INJECTION, SOLUTION INTRAMUSCULAR; INTRAVENOUS at 08:56

## 2018-01-11 RX ADMIN — ONDANSETRON 4 MG: 2 INJECTION INTRAMUSCULAR; INTRAVENOUS at 19:43

## 2018-01-11 RX ADMIN — CEFUROXIME 1.5 G: 1.5 INJECTION, POWDER, FOR SOLUTION INTRAVENOUS at 08:01

## 2018-01-11 RX ADMIN — LEVETIRACETAM 1000 MG: 100 SOLUTION ORAL at 10:50

## 2018-01-11 RX ADMIN — PROCHLORPERAZINE EDISYLATE 5 MG: 5 INJECTION INTRAMUSCULAR; INTRAVENOUS at 14:46

## 2018-01-11 RX ADMIN — LEVETIRACETAM 1000 MG: 1000 INJECTION, SOLUTION INTRAVENOUS at 21:32

## 2018-01-11 RX ADMIN — SODIUM CHLORIDE: 9 INJECTION, SOLUTION INTRAVENOUS at 17:11

## 2018-01-11 RX ADMIN — PHENYLEPHRINE HYDROCHLORIDE 100 MCG: 10 INJECTION, SOLUTION INTRAMUSCULAR; INTRAVENOUS; SUBCUTANEOUS at 12:05

## 2018-01-11 RX ADMIN — FENTANYL CITRATE 100 MCG: 50 INJECTION, SOLUTION INTRAMUSCULAR; INTRAVENOUS at 07:41

## 2018-01-11 RX ADMIN — ONDANSETRON 4 MG: 2 INJECTION INTRAMUSCULAR; INTRAVENOUS at 14:30

## 2018-01-11 RX ADMIN — PROPOFOL 200 MCG/KG/MIN: 10 INJECTION, EMULSION INTRAVENOUS at 08:29

## 2018-01-11 RX ADMIN — FENTANYL CITRATE 50 MCG: 50 INJECTION, SOLUTION INTRAMUSCULAR; INTRAVENOUS at 08:05

## 2018-01-11 RX ADMIN — PHENYLEPHRINE HYDROCHLORIDE 100 MCG: 10 INJECTION, SOLUTION INTRAMUSCULAR; INTRAVENOUS; SUBCUTANEOUS at 10:30

## 2018-01-11 RX ADMIN — CEFUROXIME 1.5 G: 1.5 INJECTION, POWDER, FOR SOLUTION INTRAVENOUS at 12:04

## 2018-01-11 RX ADMIN — FENTANYL CITRATE 50 MCG: 50 INJECTION, SOLUTION INTRAMUSCULAR; INTRAVENOUS at 13:20

## 2018-01-11 RX ADMIN — PROPOFOL 40 MG: 10 INJECTION, EMULSION INTRAVENOUS at 08:20

## 2018-01-11 RX ADMIN — FENTANYL CITRATE 25 MCG: 50 INJECTION, SOLUTION INTRAMUSCULAR; INTRAVENOUS at 11:42

## 2018-01-11 RX ADMIN — MANNITOL 28 G: 20 INJECTION, SOLUTION INTRAVENOUS at 10:19

## 2018-01-11 RX ADMIN — FENTANYL CITRATE 25 MCG: 50 INJECTION, SOLUTION INTRAMUSCULAR; INTRAVENOUS at 11:13

## 2018-01-11 RX ADMIN — FENTANYL CITRATE 50 MCG: 50 INJECTION, SOLUTION INTRAMUSCULAR; INTRAVENOUS at 12:41

## 2018-01-11 RX ADMIN — PROPOFOL 100 MG: 10 INJECTION, EMULSION INTRAVENOUS at 07:41

## 2018-01-11 RX ADMIN — LIDOCAINE HYDROCHLORIDE 100 MG: 20 INJECTION, SOLUTION INFILTRATION; PERINEURAL at 07:41

## 2018-01-11 RX ADMIN — MIDAZOLAM 1 MG: 1 INJECTION INTRAMUSCULAR; INTRAVENOUS at 07:30

## 2018-01-11 RX ADMIN — ROCURONIUM BROMIDE 30 MG: 10 INJECTION INTRAVENOUS at 07:41

## 2018-01-11 RX ADMIN — PROCHLORPERAZINE EDISYLATE 5 MG: 5 INJECTION INTRAMUSCULAR; INTRAVENOUS at 15:50

## 2018-01-11 RX ADMIN — Medication 5 MG: at 08:18

## 2018-01-11 ASSESSMENT — ENCOUNTER SYMPTOMS: SEIZURES: 0

## 2018-01-11 ASSESSMENT — VISUAL ACUITY
OU: NORMAL ACUITY

## 2018-01-11 ASSESSMENT — LIFESTYLE VARIABLES: TOBACCO_USE: 0

## 2018-01-11 ASSESSMENT — PAIN DESCRIPTION - DESCRIPTORS: DESCRIPTORS: SORE

## 2018-01-11 NOTE — IP AVS SNAPSHOT
[address] [address]    Phone:  [phone]                                       After Visit Summary   1/11/2018    Michelle Fisher    MRN: [id number]           After Visit Summary Signature Page     I have received my discharge instructions, and my questions have been answered. I have discussed any challenges I see with this plan with the nurse or doctor.    ..........................................................................................................................................  Patient/Patient Representative Signature      ..........................................................................................................................................  Patient Representative Print Name and Relationship to Patient    ..................................................               ................................................  Date                                            Time    ..........................................................................................................................................  Reviewed by Signature/Title    ...................................................              ..............................................  Date                                                            Time

## 2018-01-11 NOTE — IP AVS SNAPSHOT
MRN:[id number]                      After Visit Summary   1/11/2018    Michelle Fisher    MRN: [id number]           Thank you!     Thank you for choosing [facility] for your care. Our goal is always to provide you with excellent care. Hearing back from our patients is one way we can continue to improve our services. Please take a few minutes to complete the written survey that you may receive in the mail after you visit with us. Thank you!        Patient Information     Date Of Birth          1985        Designated Caregiver       Most Recent Value    Caregiver    Will someone help with your care after discharge? yes    Name of designated caregiver Kp Fisher, spouse    Phone number of caregiver [phone]    Caregiver address Same as patient      About your hospital stay     You were admitted on:  January 11, 2018 You last received care in the:  [address] [facility]    You were discharged on:  January 15, 2018        Reason for your hospital stay       You had surgery on the encephalocele on the right side. You were admitted for monitoring.                  Who to Call     For medical emergencies, please call 911.  For non-urgent questions about your medical care, please call your primary care provider or clinic, None  For questions related to your surgery, please call your surgery clinic        Attending Provider     Provider Specialty    Ramirez Lama MD Neurosurgery    Alexia Oglesby MD Otolaryngology       Primary Care Provider Fax #    Physician No Ref-Primary [phone]       When to contact your care team       Please notify your doctor if you experience wound breakdown, sustained bleeding from the wound site, or increasing redness, swelling, and/or purulent malorodorous discharge from the wound site which may indicate infection. If you feel it is acute, or experience sudden changes in breathing, chest pain, or excessive sleepiness/somnolence please return to the  "emergency department or call 911. If you have questions or concerns during the day please call ENT clinic and [phone]. If at night you can call [facility] at [phone] and ask for the \"ENT resident on call\".                  After Care Instructions     Activity       Your activity upon discharge: Ad Amanda. No heavy lifting (>20 lbs) for 2 weeks or until your next follow up appointment. Try not to strain for BMs, use the laxative.            Diet       Follow this diet upon discharge: Regular Diet            Wound care and dressings       Apply aquaphor to the incision line as needed to keep it moist. Try not to get the sutures too saturated with water in the shower and don't use soap to the incision line.                  Follow-up Appointments     Adult [facility] Follow-up and recommended labs and tests       Follow up in ENT clinic for suture removal on Friday 1/19/17 for a nursing visit. Please call the clinic to set up a time: [phone]. - ENT Clinic working on nurse appointment.     Follow up in ENT clinic with Dr. Oglesby and Dr. Lama on Tuesday, January 23rd at 2:00PM. Please call the clinic with questions/concerns: [phone].    Otolaryngology/ENT Clinic:  [facility]  [address] [place] [id number]                  Your next 10 appointments already scheduled     Jan 23, 2018  2:00 PM CST   (Arrive by 1:45 PM)   RETURN CRANIOFACIAL SKULL BASE with Alexia Oglesby MD   [facility] ([facility])    [address] [place] [id number] [phone]            Jan 23, 2018  2:00 PM CST   (Arrive by 1:45 PM)   RETURN CRANIOFACIAL SKULL BASE with Ramirez Lama MD   [facility] ([facility])    [address] [place] [id number] [phone]              Additional Services     " "Occupational Therapy Referral       *This therapy referral will be filtered to a centralized scheduling office at [facility] and the patient will receive a call to schedule an appointment at a [place] location most convenient for them. *     [facility] provides Occupational Therapy evaluation and treatment and many specialty services across the [facility].  If requesting a specialty program, please choose from the list below.    If you have not heard from the scheduling office within 2 business days, please call [phone] for all locations, with the exception of [place], please call [phone].     Treatment: Evaluation & Treatment  Special Instructions/Modalities: none  Special Programs: None    Please be aware that coverage of these services is subject to the terms and limitations of your health insurance plan.  Call member services at your health plan with any benefit or coverage questions.      **Note to Provider:  If you are referring outside of [place] for the therapy appointment, please list the name of the location in the \"special instructions\" above, print the referral and give to the patient to schedule the appointment.            Physical Therapy Referral       *This therapy referral will be filtered to a centralized scheduling office at [facility] and the patient will receive a call to schedule an appointment at a [place] location most convenient for them. *     [facility] provides Physical Therapy evaluation and treatment and many specialty services across the [facility].  If requesting a specialty program, please choose from the list below.    If you have not heard from the scheduling office within 2 business days, please call [phone] for all locations, with the exception of [place], please call [phone].  Treatment: Evaluation & Treatment  Special Instructions/Modalities: none  Special " "Programs: None    Please be aware that coverage of these services is subject to the terms and limitations of your health insurance plan.  Call member services at your health plan with any benefit or coverage questions.      **Note to Provider:  If you are referring outside of [place] for the therapy appointment, please list the name of the location in the \"special instructions\" above, print the referral and give to the patient to schedule the appointment.                  Additional Information     If you use hormonal birth control (such as the pill, patch, ring or implants): You'll need a second form of birth control for 7 days (condoms, a diaphragm or contraceptive foam). While in the hospital, you received a medicine called Bridion. Your normal birth control will not work as well for a week after taking this medicine.          Pending Results     No orders found from 1/9/2018 to 1/12/2018.            Statement of Approval     Ordered          01/15/18 0844  I have reviewed and agree with all the recommendations and orders detailed in this document.  EFFECTIVE NOW     Approved and electronically signed by:  More Gibbons PA-C             Admission Information     Date & Time Provider Department Dept. Phone    1/11/2018 Alexia Oglesby MD [address] [facility] [phone]      Your Vitals Were     Blood Pressure Pulse Temperature Respirations Height Weight    112/76 (BP Location: Left arm) 66 97.5  F (36.4  C) (Oral) 16 1.52 m (4' 11.84\") 55.5 kg (122 lb 5.7 oz)    Last Period Pulse Oximetry BMI (Body Mass Index)             12/27/2017 97% 24.02 kg/m2         [employer] Information     [employer] gives you secure access to your electronic health record. If you see a primary care provider, you can also send messages to your care team and make appointments. If you have questions, please call your primary care clinic.  If you do not have a primary care provider, please call [phone] and they will assist " you.        Care EveryWhere ID     This is your Care EveryWhere ID. This could be used by other organizations to access your [facility] medical records  [id number]        Equal Access to Services     ZINA HUYNH : Emil Christianson, elma green, lexiikye hsiehborisbeto fernandezharibeto, aleisha stein pitabeulah stoutcharlie deysikelsytucker varela. So [facility] [phone].    ATENCIÓN: Si habla español, tiene a terry disposición servicios gratuitos de asistencia lingüística. Llame al [phone].    We comply with applicable federal civil rights laws and [place] laws. We do not discriminate on the basis of race, color, national origin, age, disability, sex, sexual orientation, or gender identity.               Review of your medicines      START taking        Dose / Directions    acetaminophen 325 MG tablet   Commonly known as:  TYLENOL   Used for:  Temporal encephalocele (H)        Dose:  650 mg   Take 2 tablets (650 mg) by mouth every 4 hours as needed for other (multimodal surgical pain management along with NSAIDS and opioid medication as indicated based on pain control and physical function.)   Quantity:  30 tablet   Refills:  1       levETIRAcetam 1000 MG Tabs   Used for:  Temporal encephalocele (H)        Dose:  1000 mg   Take 1,000 mg by mouth every 12 hours for 4 days   Quantity:  8 tablet   Refills:  0       mineral oil-hydrophilic petrolatum   Used for:  Temporal encephalocele (H)        Apply topically as needed   Quantity:  50 g   Refills:  1       ondansetron 4 MG ODT tab   Commonly known as:  ZOFRAN-ODT   Used for:  Temporal encephalocele (H)        Dose:  4 mg   Take 1 tablet (4 mg) by mouth every 6 hours as needed for nausea or vomiting   Quantity:  12 tablet   Refills:  1       order for DME        Equipment being ordered: Walker Wheels () and Walker () Treatment Diagnosis: unsteady ambulation s/p crani   Quantity:  1 each   Refills:  0       oxyCODONE IR 5 MG tablet   Commonly known as:  ROXICODONE   Used  for:  Temporal encephalocele (H)        Dose:  5-10 mg   Take 1-2 tablets (5-10 mg) by mouth every 3 hours as needed for other (pain control or improvement in physical function. Hold dose for analgesic side effects.)   Quantity:  18 tablet   Refills:  0       senna-docusate 8.6-50 MG per tablet   Commonly known as:  SENOKOT-S;PERICOLACE   Used for:  Temporal encephalocele (H)        Dose:  1-2 tablet   Take 1-2 tablets by mouth 2 times daily as needed for constipation   Quantity:  50 tablet   Refills:  0         CONTINUE these medicines which have NOT CHANGED        Dose / Directions    PROZAC 10 MG capsule   Indication:  Depression   Generic drug:  FLUoxetine        Dose:  5 mg   Take 5 mg by mouth daily   Refills:  0            Where to get your medicines      Some of these will need a paper prescription and others can be bought over the counter. Ask your nurse if you have questions.     Bring a paper prescription for each of these medications     acetaminophen 325 MG tablet    levETIRAcetam 1000 MG Tabs    mineral oil-hydrophilic petrolatum    ondansetron 4 MG ODT tab    order for DME    oxyCODONE IR 5 MG tablet    senna-docusate 8.6-50 MG per tablet                Protect others around you: Learn how to safely use, store and throw away your medicines at www.disposemymeds.org.             Medication List: This is a list of all your medications and when to take them. Check marks below indicate your daily home schedule. Keep this list as a reference.      Medications           Morning Afternoon Evening Bedtime As Needed    acetaminophen 325 MG tablet   Commonly known as:  TYLENOL   Take 2 tablets (650 mg) by mouth every 4 hours as needed for other (multimodal surgical pain management along with NSAIDS and opioid medication as indicated based on pain control and physical function.)   Last time this was given:  650 mg on 1/15/2018  8:32 AM                                   levETIRAcetam 1000 MG Tabs   Take 1,000 mg  by mouth every 12 hours for 4 days   Last time this was given:  1,000 mg on 1/15/2018  8:32 AM            8:00 AM           8:00 PM               mineral oil-hydrophilic petrolatum   Apply topically as needed   Last time this was given:  1/15/2018  1:35 PM                                   ondansetron 4 MG ODT tab   Commonly known as:  ZOFRAN-ODT   Take 1 tablet (4 mg) by mouth every 6 hours as needed for nausea or vomiting   Last time this was given:  4 mg on 1/15/2018  1:21 PM                                   order for DME   Equipment being ordered: Walker Wheels () and Walker () Treatment Diagnosis: unsteady ambulation s/p crani                                   oxyCODONE IR 5 MG tablet   Commonly known as:  ROXICODONE   Take 1-2 tablets (5-10 mg) by mouth every 3 hours as needed for other (pain control or improvement in physical function. Hold dose for analgesic side effects.)                                   PROZAC 10 MG capsule   Take 5 mg by mouth daily   Generic drug:  FLUoxetine            8:00 AM                       senna-docusate 8.6-50 MG per tablet   Commonly known as:  SENOKOT-S;PERICOLACE   Take 1-2 tablets by mouth 2 times daily as needed for constipation   Last time this was given:  2 tablets on 1/14/2018  8:07 AM

## 2018-01-11 NOTE — BRIEF OP NOTE
[facility], [place]    Brief Operative Note    Pre-operative diagnosis: Encephalocele - right  Post-operative diagnosis Encephalocele - right  Procedure: Procedure(s):  Lumbar Drain Placement, Right Middle Cranial Fossa Encephalocele Repair  latex safe  - Wound Class: I-Clean   - Wound Class: I-Clean  Surgeon: Surgeon(s) and Role:     * Ramirez Lama MD - Primary     * Alexia Oglesby MD - Assisting     * Corby Rizvi MD - Resident - Assisting     * Carlos To MD - Resident - Assisting  Anesthesia: General   Estimated blood loss: Less than 100 ml  Drains: Lumbar drain - clamped after procedure  Specimens: * No specimens in log *  Findings:   Encephalocele with defect in middle fossa floor  Complications: None.  Implants: None.

## 2018-01-11 NOTE — ANESTHESIA POSTPROCEDURE EVALUATION
Patient: Michelle Fisher    Procedure(s):  Lumbar Drain Placement, Right Middle Cranial Fossa Encephalocele Repair  latex safe  - Wound Class: I-Clean   - Wound Class: I-Clean    Diagnosis:Encephalocele  Diagnosis Additional Information: No value filed.    Anesthesia Type:  General, ETT    Note:  Anesthesia Post Evaluation    Patient participation: Able to fully participate in evaluation  Level of consciousness: awake  Pain management: adequate  Airway patency: patent  Cardiovascular status: acceptable  Respiratory status: acceptable  Hydration status: acceptable  PONV: inadequately controlled     Anesthetic complications: None    Comments: Has received added ondansetron, several doses of compazine and a scop patch - still intermittently nauseated.   Neuro intact, facial nerve OK, otherwise stable.          Last vitals:  Vitals:    01/11/18 1500 01/11/18 1515 01/11/18 1530   BP: 108/75 130/87 113/74   Resp: 12 11 12   Temp:      SpO2: 99% 99% 99%         Electronically Signed By: Shane Zapata MD  January 11, 2018  3:57 PM

## 2018-01-11 NOTE — ANESTHESIA PROCEDURE NOTES
Arterial Line Procedure Note  Staff:     Anesthesiologist:  LI RUSHING    Resident/CRNA:  NORMA ROY    Arterial line performed by resident/CRNA in presence of a teaching physician    Location: In OR After Induction  Procedure Start/Stop Times:     patient identified, IV checked, risks and benefits discussed, informed consent, monitors and equipment checked and pre-op evaluation      Correct Patient: Yes      Correct Position: Yes      Correct Site: Yes      Correct Procedure: Yes      Correct Laterality:  N/A    Site Marked:  N/A  Line Placement:     Procedure:  Arterial Line    Insertion Site:  Radial    Insertion laterality:  Left    Skin Prep: Chloraprep      Patient Prep: patient draped, mask, sterile gloves, hat and hand hygiene      Local skin infiltration:  None    Ultrasound Guided?: Yes      Artery evaluated via ultrasound confirming patency.   Using realtime imaging, the artery was punctured and the needle was observed entering the artery.      A permanent image is entered into patient's chart.      Catheter size:  20 gauge, 12 cm    Cath secured with: suture      Dressing:  Tegaderm    Complications:  None obvious    Arterial waveform: Yes      IBP within 10% of NIBP: Yes

## 2018-01-11 NOTE — ANESTHESIA PREPROCEDURE EVALUATION
Anesthesia Evaluation     . Pt has not had prior anesthetic            ROS/MED HX    ENT/Pulmonary:  - neg pulmonary ROS    (-) tobacco use   Neurologic:     (+)other neuro right encephalocele   (-) seizures   Cardiovascular:  - neg cardiovascular ROS   (+) ----. : . . . :. . No previous cardiac testing       METS/Exercise Tolerance: Comment: Can walk 5 miles.  On feet all day at work.  >4 METS   Hematologic:  - neg hematologic  ROS       Musculoskeletal:  - neg musculoskeletal ROS       GI/Hepatic:  - neg GI/hepatic ROS       Renal/Genitourinary:  - ROS Renal section negative       Endo:  - neg endo ROS       Psychiatric:     (+) psychiatric history depression      Infectious Disease:  - neg infectious disease ROS       Malignancy:      - no malignancy   Other:    (+) Possibly pregnant LMP: Nov 27, 2017,                    Physical Exam  Normal systems: dental    Airway   Mallampati: I  TM distance: >3 FB  Neck ROM: full    Dental     Cardiovascular   Rhythm and rate: regular and normal  (-) carotid bruit is not present, no peripheral edema and no murmur    Pulmonary    breath sounds clear to auscultation    Other findings:   12/12/2017 14:57  Sodium: 139  Potassium: 3.6  Chloride: 106  Carbon Dioxide: 30  Urea Nitrogen: 11  Creatinine: 0.75  GFR Estimate: 89  GFR Estimate If Black: >90  Calcium: 8.6  Anion Gap: 4  Glucose: 90    WBC: 6.0  Hemoglobin: 13.6  Hematocrit: 41.4  Platelet Count: 259  RBC Count: 4.45  MCV: 93  MCH: 30.6  MCHC: 32.9  RDW: 11.9    INR: 1.00  PTT: 37             PAC Discussion and Assessment    ASA Classification: 2  Case is suitable for: [place] and [place]  Anesthetic techniques and relevant risks discussed: GA  Invasive monitoring and risk discussed: Yes  Types:   Possibility and Risk of blood transfusion discussed: Yes  NPO instructions given:   Additional anesthetic preparation and risks discussed:   Needs early admission to pre-op area:   Other:     PAC Resident/NP Anesthesia  Assessment:  Scheduled for Lumbar Drain Placement, Middle Cranial Fossa Encephalocele   latex safe  on 1/11/18 by Dr. Lama and Dr. Oglesby in treatment of encephalocele.  PAC referral for risk assessment and optimization for anesthesia with comorbid conditions of:  **1st time having surgery    Pre-operative considerations:  1.  Cardiac:  Functional status very good.  Can walk miles. 0.4%  risk of major adverse cardiac event.  Risk of MACE < 1%. METS>4.  No further cardiac evaluation needed per 2014 ACC/AHA guidelines for non-cardiac surgery.  2.  Pulm:  Airway feasible.  HOMER risk: low.  Non smoker.   3.  GI:  Risk of PONV score = 3.  If 3 or > anti-emetic intervention recommended (with 2 or more meds).   4.  Neuro:  Encephalocele, right ear hearing loss. History of migraines and vertigo.       Patient is optimized and is acceptable candidate for the proposed procedure.  No further diagnostic evaluation is needed.     Patient also evaluated by Dr. Marquez. See recommendations below.           Reviewed and Signed by PAC Mid-Level Provider/Resident  Mid-Level Provider/Resident: Kenna Arias PA-C  Date: 12/12/17  Time: 1345    Attending Anesthesiologist Anesthesia Assessment:  32 year old for lumbar drain, middle crani fossa encephalocele repair. Chart reviewed, patient seen and evaluated; agree with above assessment. Patient has no significant cardiac or pulmonary disease.     Patient is appropriate for the planned procedure without further workup or medical management change. The final anesthesia plan will be determined by the physician anesthesiologist caring for the patient on the day of surgery.      Reviewed and Signed by PAC Anesthesiologist  Anesthesiologist: diana  Date: 12/12/2017  Time:   Pass/Fail: Pass  Disposition:     PAC Pharmacist Assessment:        Pharmacist:   Date:   Time:      Anesthesia Plan      History & Physical Review      ASA Status:  2 .        Plan for General and ETT with Intravenous  induction. Maintenance will be Balanced.    PONV prophylaxis:  Ondansetron (or other 5HT-3) and Dexamethasone or Solumedrol  Additional equipment: 2nd IV and Arterial Line ANESTHESIA PREOP EVALUATION    PROCEDURE: Lumbar drain insertion, Combined craniotomy and excision/repair of encephalocele    SUMMARY: Michelle Fisher is a 32 year old female who presents for the above procedure for encephalocele in middle cranial fossa.      ASSESSMENT & PLAN:  - ASA 2  - GETA with standard ASA monitors, IV induction, and balanced anesthetic  - PIV x 2, arterial line  - Antibiotics per surgery  - PONV prophylaxis  - Blood products available, possible administration discussed with patient  - Relevant risks, benefits, alternatives and the anesthetic plan was discussed.  All questions were answered and there was agreement to proceed.          Postoperative Care  Postoperative pain management:  Multi-modal analgesia.      Consents                          .

## 2018-01-11 NOTE — BRIEF OP NOTE
[facility], [place]    Brief Operative Note    Pre-operative diagnosis: Encephalocele  Post-operative diagnosis * No post-op diagnosis entered *  Procedure: Procedure(s):  Lumbar Drain Placement, Right Middle Cranial Fossa Encephalocele Repair  latex safe  - Wound Class: I-Clean   - Wound Class: I-Clean  Surgeon: Surgeon(s) and Role:     * Ramirez Lama MD - Primary     * Alexia Oglesby MD - Assisting     * Corby Rizvi MD - Resident - Assisting     * Carlos To MD - Resident - Assisting  Anesthesia: General   Estimated blood loss: Less than 75 ml  Drains: None  Specimens: * No specimens in log *  Findings:   encephalocele and CSF present in right mesotympanum; ossicles intact but hypomobile; some residual cortical tissue present in mesotympanium adherent to stapes, unable to be removed. Dehiscent portion of meatal and labyrinthine segments of facial nerve with exposed geniculate ganglion and GSPN  Complications: None.  Implants: synthes titanium plate placed over middle cranial fossa

## 2018-01-12 ENCOUNTER — APPOINTMENT (OUTPATIENT)
Dept: CT IMAGING | Facility: CLINIC | Age: 33
DRG: 027 | End: 2018-01-12
Attending: NURSE PRACTITIONER
Payer: COMMERCIAL

## 2018-01-12 LAB
ANION GAP SERPL CALCULATED.3IONS-SCNC: 9 MMOL/L (ref 3–14)
BASOPHILS # BLD AUTO: 0 10E9/L (ref 0–0.2)
BASOPHILS NFR BLD AUTO: 0.1 %
BUN SERPL-MCNC: 8 MG/DL (ref 7–30)
CALCIUM SERPL-MCNC: 8.1 MG/DL (ref 8.5–10.1)
CHLORIDE SERPL-SCNC: 107 MMOL/L (ref 94–109)
CO2 SERPL-SCNC: 26 MMOL/L (ref 20–32)
CREAT SERPL-MCNC: 0.65 MG/DL (ref 0.52–1.04)
DIFFERENTIAL METHOD BLD: ABNORMAL
EOSINOPHIL # BLD AUTO: 0 10E9/L (ref 0–0.7)
EOSINOPHIL NFR BLD AUTO: 0 %
ERYTHROCYTE [DISTWIDTH] IN BLOOD BY AUTOMATED COUNT: 12.6 % (ref 10–15)
GFR SERPL CREATININE-BSD FRML MDRD: >90 ML/MIN/1.7M2
GLUCOSE SERPL-MCNC: 129 MG/DL (ref 70–99)
HCT VFR BLD AUTO: 37.6 % (ref 35–47)
HGB BLD-MCNC: 12.4 G/DL (ref 11.7–15.7)
IMM GRANULOCYTES # BLD: 0 10E9/L (ref 0–0.4)
IMM GRANULOCYTES NFR BLD: 0.1 %
LYMPHOCYTES # BLD AUTO: 0.9 10E9/L (ref 0.8–5.3)
LYMPHOCYTES NFR BLD AUTO: 5.6 %
MAGNESIUM SERPL-MCNC: 1.8 MG/DL (ref 1.6–2.3)
MCH RBC QN AUTO: 30.6 PG (ref 26.5–33)
MCHC RBC AUTO-ENTMCNC: 33 G/DL (ref 31.5–36.5)
MCV RBC AUTO: 93 FL (ref 78–100)
MONOCYTES # BLD AUTO: 1.2 10E9/L (ref 0–1.3)
MONOCYTES NFR BLD AUTO: 7.8 %
NEUTROPHILS # BLD AUTO: 13.2 10E9/L (ref 1.6–8.3)
NEUTROPHILS NFR BLD AUTO: 86.4 %
NRBC # BLD AUTO: 0 10*3/UL
NRBC BLD AUTO-RTO: 0 /100
PHOSPHATE SERPL-MCNC: 2.5 MG/DL (ref 2.5–4.5)
PLATELET # BLD AUTO: 219 10E9/L (ref 150–450)
POTASSIUM SERPL-SCNC: 3.6 MMOL/L (ref 3.4–5.3)
RBC # BLD AUTO: 4.05 10E12/L (ref 3.8–5.2)
SODIUM SERPL-SCNC: 142 MMOL/L (ref 133–144)
WBC # BLD AUTO: 15.3 10E9/L (ref 4–11)

## 2018-01-12 PROCEDURE — 80048 BASIC METABOLIC PNL TOTAL CA: CPT | Performed by: NURSE PRACTITIONER

## 2018-01-12 PROCEDURE — 84100 ASSAY OF PHOSPHORUS: CPT | Performed by: NURSE PRACTITIONER

## 2018-01-12 PROCEDURE — 70450 CT HEAD/BRAIN W/O DYE: CPT

## 2018-01-12 PROCEDURE — 25000128 H RX IP 250 OP 636: Performed by: NURSE PRACTITIONER

## 2018-01-12 PROCEDURE — 25000128 H RX IP 250 OP 636: Performed by: NEUROLOGICAL SURGERY

## 2018-01-12 PROCEDURE — 25000132 ZZH RX MED GY IP 250 OP 250 PS 637: Performed by: NURSE PRACTITIONER

## 2018-01-12 PROCEDURE — 83735 ASSAY OF MAGNESIUM: CPT | Performed by: NURSE PRACTITIONER

## 2018-01-12 PROCEDURE — 85025 COMPLETE CBC W/AUTO DIFF WBC: CPT | Performed by: NURSE PRACTITIONER

## 2018-01-12 PROCEDURE — 84132 ASSAY OF SERUM POTASSIUM: CPT | Performed by: NURSE PRACTITIONER

## 2018-01-12 PROCEDURE — 36415 COLL VENOUS BLD VENIPUNCTURE: CPT | Performed by: NURSE PRACTITIONER

## 2018-01-12 PROCEDURE — 20000004 ZZH R&B ICU UMMC

## 2018-01-12 PROCEDURE — 25000132 ZZH RX MED GY IP 250 OP 250 PS 637: Performed by: NEUROLOGICAL SURGERY

## 2018-01-12 RX ADMIN — LEVETIRACETAM 1000 MG: 1000 INJECTION, SOLUTION INTRAVENOUS at 21:17

## 2018-01-12 RX ADMIN — LEVETIRACETAM 1000 MG: 1000 INJECTION, SOLUTION INTRAVENOUS at 10:52

## 2018-01-12 RX ADMIN — METOCLOPRAMIDE 10 MG: 5 INJECTION, SOLUTION INTRAMUSCULAR; INTRAVENOUS at 08:26

## 2018-01-12 RX ADMIN — SODIUM CHLORIDE: 9 INJECTION, SOLUTION INTRAVENOUS at 14:18

## 2018-01-12 ASSESSMENT — VISUAL ACUITY
OU: NORMAL ACUITY

## 2018-01-12 NOTE — OR NURSING
Dr. Zapata at bedside - aware arterial line has been positional (with appropriate readings with repositioning of wrist), recently has not had appropriate readings even with manipulation.  Dr. Zapata utilized a ultrasound machine to check arterial line placement.  Per Dr. Zapata, writer to leave arterial line in and for intensivist to assess.  Pt okay to transfer to .

## 2018-01-12 NOTE — PLAN OF CARE
"Problem: Patient Care Overview  Goal: Plan of Care/Patient Progress Review  Outcome: No Change                                                                                                       Nursing Progress Note     D/I/A: Pt admitted to SICU s/p R middle cranial fossa encephalocele repair and lumbar drain placement.      Neuro- Neuro checks q1hr. Pt lethargic, arouses to voice. Oriented x4. PERRLA. Hand /ankle strength strong, equal bilaterally (4/4 throughout). Denies N/T/decreased sensation. Lumbar drain clamped. Head dressing with no change to marked drainage from PACU.  CV- Sinus arrhythmia, varies with breathing. Tachycardic to 140 BPM when vomiting or nauseous. No ectopy. BP stable, no intervention to maintain SBP <140mmHg.  Pulm- On RA. LS clear, equal bilaterally. Refusing ISP use d/t lethargy.  GI- BS hypoactive, faint x4. Two episodes of emesis with PO intake. Scopolamine patch in place. PRN Zofran given x2. No further complaints of nausea or vomiting.  - Hobbs catheter in place with 50-75cc/hr light, clear urine.   Skin- Lumbar drain in place. Dressing checked q1hr with neuro checks per order, c/d/i. R crani site with dried drainage.   Gtts- NaCl 50mL/hr  ID- Afebrile  Labs- WBC 15.3. Electrolyte protocols maintained.  Pain- Reports mild pain to head, sore. Denies analgesics, would like to sleep.  Activity- HOB at 30 degrees. Up as tolerated.  P: Continue to monitor and assess. Update POC as needed.    /68 (BP Location: Right arm)  Temp 98.8  F (37.1  C) (Oral)  Resp 16  Ht 1.52 m (4' 11.84\")  Wt 56.2 kg (123 lb 14.4 oz)  LMP 12/27/2017  SpO2 97%  BMI 24.32 kg/m2   Lance Hawthorne 5:12 AM January 12, 2018      "

## 2018-01-12 NOTE — OP NOTE
DATE OF PROCEDURE:  01/11/2018      PREOPERATIVE DIAGNOSIS:  Right-sided encephalocele through a defect in the middle fossa floor.      POSTOPERATIVE DIAGNOSIS:  Right-sided encephalocele through a defect in the middle fossa floor.      PROCEDURES PERFORMED:       1.  Lumbar drain placement.   2.  Right-sided temporal craniotomy and repair of the middle fossa floor and dural repair.   3.  Intraoperative monitoring of the facial nerves.      STAFF SURGEON:  Ramirez Lama MD      COSURGEON:  Alexia Oglesby MD     RESIDENT SURGEON:  Corby Rizvi MD       ENT RESIDENT:  Carlos To MD       ESTIMATED BLOOD LOSS:  75 mL      ANESTHESIA:  General.      INDICATIONS FOR PROCEDURE:  Ms. Michelle Fisher is a 32-year-old lady who was seen at the [facility] for an encephalocele herniating through the right middle fossa floor with fluid in the mastoid cavity.  She has conductive hearing loss.  To prevent progression of the herniation and overtly eventual external CSF leak, the patient consented for the above-mentioned procedure after risks and benefits and alternative treatments were explained to her.      DESCRIPTION OF PROCEDURE:      PROCEDURE:  #1: Lumbar drain placement.      The patient was brought into the operating room by our anesthesia colleagues.  She underwent induction of a general anesthesia. A Keppra load was given.  She was positioned on the lateral right side down decubitus position and her low back was then prepped and draped in a standard sterile fashion.  Appropriate timeout was conducted.  The Tuohy needle was then advanced between what we believed to be L4-5 interspinous area and down into the intrathecal area and then down into the L4-5 interspinous ligaments and down into the thecal sac at L4-5 level.  Good CSF return was noted, and then the lumbar drain was advanced, and then the Tuohy needle was removed, and then the lumbar drain was secured with 3-0 nylon on her  back and then was connected to the Lopez drain.  Good flow was noted at the end of procedure.  Only 1 pass was needed.        PROCEDURE #2: Right middle fossa craniotomy:      The patient was then positioned supine on the operating table with a bump under the right shoulder making sure that all the pressure points were well padded and well supported.  Also we double checked the lumbar drain; it was working.  The [place] head ko was placed on the patient's head, and the position was held so the right squamous temporal bone was parallel to the floor.  NIM monitoring were placed perioral and periocular facial nerve conduction to muscles.  The proposed incision was marked from in front of the tragus at the zygoma all the way up to the temporal line. The lumbar drain was double checked at this point to make sure it was working.  The surgical site was then prepped and draped in a standard sterile fashion.  At this point we opened the lumbar drain for drainage and the perioperative antibiotics were also administered and sequential compression devices were applied.  Then we injected 10 mL of 1% lidocaine with 1:100,000 epinephrine local.  Appropriate timeout was conducted.      A #10 blade was used to open the skin incision, and then monopolar cautery was used to dissect down to the temporalis fascia. A 10 blade was used to open up the temporal fascia and then monopolar cautery was used to split  the temporal muscles down to the bone. Poipu retractors were placed to maintain exposure.  Periosteal elevators  were used to elevate the pericranium off of the bone.  Good retraction was performed.  We put a single bur hole samir-inferiorly at the base of our exposure and then turned a craniotomy flapabout 3 x 4 cm craniotomy square in  Shape. At this point we administered 0.5 g/kg of mannitol to enhance brain relaxation. We placed several small holes for circumferential tack-up sutures.      Then at this point the ENT  team scrubbed in to go over for the exposure of the middle fossa and the removal of the herniated brain tissue.  Please refer to Dr. Oglesby' notes for further detailed description of their portion of the procedure.      The surgical microscope was brought in sterilely for the remaining portion of the procedure.  Under the microscope after the ENT's exposure we were able to identify geniculate ganglion, ossicles and the defect in the floor of the middle fossa posterior to the middle ear. Then we evaluated the dura, and there were 3 locations over the dural opening were identified.  After placing small pieces of gelfoam under the dural openings, these three openings were primarily closed with the 4-0 Vicryl stitches.  Two of those openings were small enough where one stitch was enough to close it.  The third one was about a quarter-inch which required two 4-0 Vicryl stitches, simple interrupted ones, to close it.        Once the dura was repaired, we turned our attention to crafting a titanium plate to cover the bony defect and protect the ossicles and geniculate ganglion. This required several cycles of contouring the plate and checking its shape. Ultimately it was fixed in place with 4 mm screws were placed laterally in the bone at the bottom of the craniotomy.   We fashioned a Durepair graft to place over the plate and tacked it to the plate laterally. We placed tackup sutures around the front, top and back of the craniotomy, approximating the dura to the bone. There was a large space created between the dura and the bone. We fashioned a second Durepair graft which was laid on the dura. The dead space was filled with gelfoam. Adequate hemostasis was assured and the wound irrigated with antibiotic - containing saline.  We replaced the bone and secured it with titanium plates. The temporalis fascia was then brought together using 2-0 Vicryl stitches, then the galeal layer was subcutaneously brought together using 2-0  Vicryl stitches and then the skin was closed using 3-0 nylon in running fashion.  Incision was then cleaned with wet and dry.  ChloraPrep and dry dressing were applied.      The patient was successfully extubated in the OR, taken to PACU in stable condition.  No immediate complication.  Counts were correct x2 at the end of the procedure.      RAMIREZ LAMA MD       As dictated by PABLO RIVERA MD      I, Dr. Ramirez Lama, was scrubbed for the critical neurosurgical portions of the procedure and was immediately available for the remaining rest of the operation.     D: 2018 10:22   T: 2018 11:17   MT: [email or web address]      Name:     EUN CASTILLO   MRN:      [id number]        Account:        [id number]   :      1985           Procedure Date: 2018      Document: [id number]

## 2018-01-12 NOTE — PLAN OF CARE
Problem: Patient Care Overview  Goal: Plan of Care/Patient Progress Review  Outcome: No Change  Events: lumbar drain removed. Neuro checks QH while in ICU overnight.     Assessment: neuro intact, A&Ox4, lethargic- MD aware. PERRLA. Follows. No overt deficits. CV- sinus arrhythmia varies with RR. Afebrile. +2 pulses. Resp- Room air, lungs clear. GI/- francisco in place, auop. No stool. +flatus per pt report. Hypoactive BS. Tolerating regular diet but no appetite. Encouraged PO intake. Nausea resolved. B/l PIV's.   Activity: ambulated to bathroom once otherwise turns Q2H/independently repositioning.     Plan: trasnfer to 6A tomorrow. Increase activity/OOB/advance diet as able.

## 2018-01-12 NOTE — PROGRESS NOTES
[facility]  Neurosurgery Daily ICU Note:          Assessment   Michelle Fisher is a 32 year old female who is postoperative day #1 from right sided encephalocele repair. .           Plan 1.   Neuro: S/P encphalocele repair.     Continue frequent neuro exams while in ICU. Notify MD for acute changes in exam.    Pain control    Keppra for seizure ppx    Lumbar drain to remain clamped for approximately 24 hours.   2. CVS: hemodynamically stable    Maintain SBP < 140    Hydralazine and labetolol PRN    Continuous cardiac monitoring while in ICU  3. Pulmonary: no issues    Continuous pulse oximetry    Supplemental oxygen PRN    Incentive spirometry Q1H while awake  4. GI:     Advance diet as tolerated to regular    Bowel regimen. PRN anti-emetics.    Protonix for ulcer ppx  5. Renal: no issues    mIVF -- TKO when patient is tolerating good PO intake    Hobbs can be removed on POD#1.     Electrolyte replacement protocol    Continue to monitor intake/output  6. ID: afebrile, normal WBC count    Continue to monitor for fevers and/or signs of infection  7. Endocrine: No steroids    Continue glucose checks    Continue sliding scale insulin  8. Heme: no issues    Platelets > 100,000    INR < 1.5    Hemoglobin > 8  9. Prophylaxis    DVT: SCDs while in bed    GI: Protonix  10. Disposition: Surgical ICU    PT/OT    Above patient and plan has been discussed with the neurosurgery chief resident.     Please dial * * * [phone] and enter job code [phone] to reach the on-call neurosurgery resident if you have questions.          Subjective     No acute events overnight.          Objective   Temp:  [97.7  F (36.5  C)-98.6  F (37  C)] 98.6  F (37  C)  Heart Rate:  [] 75  Resp:  [11-18] 18  BP: ()/() 106/63  MAP:  [8 mmHg-125 mmHg] 8 mmHg  Arterial Line BP: (8-127)/(7-120) 8/7  SpO2:  [94 %-100 %] 96 %    No Data Recorded    Resp: 18    I/O last 3 completed shifts:  In: 2590.83 [P.O.:50;  I.V.:2540.83]  Out: 2750 [Urine:2700; Other:50]    Physical Exam  General: NAD, lying comfortably in bed  Incision: clean, dry, dressing intact  Neurologic    Mental Status:       -- Awake; Alert; oriented x 3      -- Follows commands       -- Speech fluent, spontaneous. No aphasia or dysarthria.      -- no gaze preference. No apparent hemineglect.    Cranial Nerves:      -- visual fields full to confrontation, PERRL 3-2mm bilat and brisk      -- extraocular movements intact      -- face symmetrical, tongue midline      -- sensory V1-V3 intact bilaterally      -- palate elevates symmetrically, uvula midline      -- hearing grossly intact bilat    Motor:    Delt Bi Tri WE WF    R 5 5 5 5 5 5   L 5 5 5 5 5 5    IP Quad Ham DF PF EHL   R 5 5 5 5 5 5   L 5 5 5 5 5 5     Sensory: symmetrically intact to light touch x4 extremities.           Labs and Imaging   BMP  Recent Labs  Lab 01/11/18  1056 01/11/18  1050 01/11/18  1009    141 142  141   POTASSIUM 3.5 3.7 3.3*  3.2*   CHLORIDE  --  109 111*   CO2  --  24 24   BUN  --  9 9   CR  --  0.65 0.65   ALEXANDRA  --  7.6* 7.9*       CBC  Recent Labs  Lab 01/11/18  1056 01/11/18  1009   HGB 11.1* 12.8       COAGSNo results for input(s): INR, PTT, AXA, FIBR in the last 168 hours.    ABG  Recent Labs  Lab 01/11/18  1056 01/11/18  1009   PH 7.41 7.40   PCO2 36 37   PO2 183* 180*   HCO3 23 23     IMAGING: No new imaging.      Contact the neurosurgery resident on call with questions.    Dial * * *[phone]: Enter [phone] when prompted.   Rodney Aiken MD, PhD  Neurosurgery PGY-3    Attending:  Michelle is somewhat more drowsy than expected today. She awakens easily and answers questions appropriately. She does not have a focal neurologic deficit.   We obtained a CT scan. She has the expected amount intracranial air but a little more midline shift than expected. However, she has baseline midline shift and ventricular asymmetry.  He exam is more suggestive of encephalopathy than  increased ICP and she does not have a surgical mass lesion on her scan. We will observe her for now. I do not think we should open the lumbar drain and therefore it will be removed.    I have explained this to her parents. I also have told them that Dr. Jacklyn Still will be covering for me for the next week.    Ramirez Lama MD

## 2018-01-12 NOTE — PLAN OF CARE
Problem: Patient Care Overview  Goal: Plan of Care/Patient Progress Review  Outcome: No Change    Pt arrived on 4A from PACU at 5PM s/p encephalocele repair and lumbar drain placement.     Lumbar drain clamped, 150 mL clear CSF in drainage bag.   Pt lethargic, oriented x4. Alertness unchanged. Denies pain. Denies numbness/tingling. No back pain when raising legs off bed.   Small amount of drainage on R head marked in PACU, unchanged.   Sinus arrhythmia 60-90s. Normotensive.   Lungs CTA, 2 L NC. EtCO2 45-48. RR 12-16.   Hobbs in place, clear pale urine.   Scopolamine patched behind left ear placed in PACU, pt denies nausea.   Arterial line in R radial artery does not draw; waveform dampened.   NS infusing at 50 mL/hr.

## 2018-01-12 NOTE — PROGRESS NOTES
"ENT Progress Note  1/12/2018    S: Ms. Fisher is doing well this morning. No acute events overnight, afebrile and vitally stable. She has been dealing with nausea since surgery, with emesis x1. Antiemetics have been ordered, but minimally effective. The LD was clamped after surgery, planning to resume this morning.     O:  /72  Temp 98.8  F (37.1  C) (Oral)  Resp 16  Ht 1.52 m (4' 11.84\")  Wt 56.2 kg (123 lb 14.4 oz)  LMP 12/27/2017  SpO2 97%  BMI 24.32 kg/m2  PHYSICAL EXAM  Gen: awake, alert; very nauseous, in discomfort  HEENT: EOMI, no nystagmus; face is symmetric, HB1; Right preauricular incision is clean, dry, and intact; no otorrhea present   Resp: non labored breathing on room air    Lab Results   Component Value Date    WBC 15.3 01/12/2018     Lab Results   Component Value Date    HGB 12.4 01/12/2018     Lab Results   Component Value Date    HCT 37.6 01/12/2018     Lab Results   Component Value Date     01/12/2018      Lab Results   Component Value Date    POTASSIUM 3.6 01/12/2018     A/P: Ms. Fisher is a 32yoF POD1 s/p Right middle cranial fossa approach for encephalocele repair with implantation of a titanium sythes plate. She has been doing well postoperatively, but has been battling nausea since surgery. Minimal PO. NSG currently primary, plan to transfer to ENT primary once lumbar drain removed.  Neuro:   - Lumbar drain in place. Clamped postoperatively, but planning to open today.    - Lumbar drain and Keppra per NSG   - Pain meds per SICU  HEENT:   - Bacitracin TID to Right preauricular incision  CV:   - HDS, no issues   - PRN hydralazine/labetalol   Resp:   - Stable saturation on room air  GI:   - PRN: reglan, zofran, compazine   - ADAT, encourage PO   - NS @ 50cc/hr  Endo:   - No issues   - Monitor BG  Renal:   - Adequate UOP  Heme/ID:   - WBC elevated, likely 2/2 perioperative steroids. Low suspicion for infection. Continue to monitor   - Perioperative cefuroxime complete  PPx:   - " SCDs    This patient was discussed with Dr. Mendel Mazariegos MD  Otolaryngology Resident

## 2018-01-13 LAB — GLUCOSE BLDC GLUCOMTR-MCNC: 120 MG/DL (ref 70–99)

## 2018-01-13 PROCEDURE — 25000132 ZZH RX MED GY IP 250 OP 250 PS 637: Performed by: NURSE PRACTITIONER

## 2018-01-13 PROCEDURE — 25000132 ZZH RX MED GY IP 250 OP 250 PS 637: Performed by: NEUROLOGICAL SURGERY

## 2018-01-13 PROCEDURE — 12000008 ZZH R&B INTERMEDIATE UMMC

## 2018-01-13 PROCEDURE — 25000128 H RX IP 250 OP 636: Performed by: NURSE PRACTITIONER

## 2018-01-13 PROCEDURE — 00000146 ZZHCL STATISTIC GLUCOSE BY METER IP

## 2018-01-13 PROCEDURE — 40000141 ZZH STATISTIC PERIPHERAL IV START W/O US GUIDANCE

## 2018-01-13 PROCEDURE — 40000802 ZZH SITE CHECK

## 2018-01-13 RX ADMIN — FLUOXETINE HYDROCHLORIDE 5 MG: 20 SOLUTION ORAL at 08:01

## 2018-01-13 RX ADMIN — SENNOSIDES AND DOCUSATE SODIUM 1 TABLET: 8.6; 5 TABLET ORAL at 08:01

## 2018-01-13 RX ADMIN — SODIUM CHLORIDE: 9 INJECTION, SOLUTION INTRAVENOUS at 10:24

## 2018-01-13 RX ADMIN — LEVETIRACETAM 1000 MG: 500 TABLET ORAL at 08:01

## 2018-01-13 RX ADMIN — LEVETIRACETAM 1000 MG: 500 TABLET ORAL at 21:32

## 2018-01-13 RX ADMIN — SENNOSIDES AND DOCUSATE SODIUM 2 TABLET: 8.6; 5 TABLET ORAL at 21:32

## 2018-01-13 ASSESSMENT — VISUAL ACUITY
OU: NORMAL ACUITY

## 2018-01-13 ASSESSMENT — PAIN DESCRIPTION - DESCRIPTORS: DESCRIPTORS: ACHING

## 2018-01-13 NOTE — PROGRESS NOTES
Lumbar drain removed, catheter tip intact.  Single figure of 8 monocryl stitch placed.  Patient tolerated the procedure well.

## 2018-01-13 NOTE — PROGRESS NOTES
[facility]  Neurosurgery Daily ICU Note:          Assessment   Michelle Fisher is a 32 year old female who is postoperative day #2 from right sided encephalocele repair. .           Plan 1.   Neuro: S/P encphalocele repair.     Continue frequent neuro exams while in ICU. Notify MD for acute changes in exam.    Pain control    Keppra for seizure ppx    Lumbar drain removed.      Post-op head CT demonstrated pneumocephalus. Will continue to monitor.   2. CVS: hemodynamically stable    Maintain SBP < 140    Hydralazine and labetolol PRN    Continuous cardiac monitoring while in ICU  3. Pulmonary: no issues    Continuous pulse oximetry    Supplemental oxygen PRN    Incentive spirometry Q1H while awake  4. GI: Advance diet as tolerated to regular    Bowel regimen. PRN anti-emetics.  5. Renal: no issues    mIVF -    Electrolyte replacement protocol    Continue to monitor intake/output  6. ID: afebrile, normal WBC count    Continue to monitor for fevers and/or signs of infection  7. Endocrine: No steroids    Continue glucose checks  8. Heme    Platelets > 100,000    INR < 1.5    Hemoglobin > 8  9. Prophylaxis    DVT: SCDs while in bed    GI: Protonix  10. Disposition: Surgical ICU      Above patient and plan has been discussed with the neurosurgery chief resident.     Please dial * * * [phone] and enter job code [phone] to reach the on-call neurosurgery resident if you have questions.          Subjective     No acute events overnight.          Objective   Temp:  [98.2  F (36.8  C)-99.5  F (37.5  C)] 98.2  F (36.8  C)  Heart Rate:  [68-91] 73  Resp:  [16-18] 18  BP: ()/(60-80) 119/62  SpO2:  [96 %-99 %] 99 %  Resp: 18  I/O last 3 completed shifts:  In: 1520 [P.O.:270; I.V.:1250]  Out: 1800 [Urine:1800]    Physical Exam  General: NAD, lying comfortably in bed  Incision: clean, dry, dressing removed  Neurologic    Mental Status:       -- Awake; Alert; oriented x 3. More interactive.       -- Follows  commands       -- Speech fluent, spontaneous. No aphasia or dysarthria.      -- no gaze preference. No apparent hemineglect.    Cranial Nerves:      -- PERRL 3-2mm bilat and brisk      -- extraocular movements intact      -- face symmetrical, tongue midline      -- sensory V1-V3 intact bilaterally      -- palate elevates symmetrically, uvula midline      -- Hearing deficit in left ear - baseline.     Motor:    Delt Bi Tri WE WF    R 5 5 5 5 5 5   L 5 5 5 5 5 5    IP Quad Ham DF PF EHL   R 5 5 5 5 5 5   L 5 5 5 5 5 5     Sensory: symmetrically intact to light touch x4 extremities.           Labs and Imaging   BMP    Recent Labs  Lab 01/12/18  0415 01/11/18  1056 01/11/18  1050 01/11/18  1009    137 141 142  141   POTASSIUM 3.6 3.5 3.7 3.3*  3.2*   CHLORIDE 107  --  109 111*   CO2 26  --  24 24   BUN 8  --  9 9   CR 0.65  --  0.65 0.65   ALEXANDRA 8.1*  --  7.6* 7.9*       CBC    Recent Labs  Lab 01/12/18  0415 01/11/18  1056 01/11/18  1009   WBC 15.3*  --   --    HGB 12.4 11.1* 12.8     --   --      IMAGING: Post-op head CT completed.      Contact the neurosurgery resident on call with questions.    Dial * * *[phone]: Enter [phone] when prompted.

## 2018-01-13 NOTE — PLAN OF CARE
Neuro: q1h hr neuro checks. Pt A&Ox4, follows commands, lethargic but now more alert this AM. MD aware. PERR. No overt deficits. MD changed Neuro checks to q2h, if she does not become more awake/alert, will consider CT head.  CV: HR sinus arrhythmia 60-80's. Tmax 99.5. Pulses +2.   Resp: Room air, LS clear, RR wnl. Denies any SOB or CP  GI/: ordered breakfast this AM. Hobbs in place with adequate output. No BM overnight, passing gas per pt. Denied any nausea or vomiting, but was hesitant to take any PO last night due to vomiting from PO in the past.     Plan: Encourage pt to eat, increase activity. Transfer to 6A.

## 2018-01-13 NOTE — PROGRESS NOTES
"ENT Progress Note  1/13/2018    S: Ms. Fisher is doing well this morning. No acute events overnight, afebrile and vitally stable.  Her lumbar drain was pulled yesterday, and her neuro status is improving. She's more alert and oriented. Getting up and out of bed with nursing. PO is improving, denies nausea or any more episodes of emesis.     O:  /62  Temp 98.2  F (36.8  C) (Axillary)  Resp 18  Ht 1.52 m (4' 11.84\")  Wt 55.5 kg (122 lb 5.7 oz)  LMP 12/27/2017  SpO2 99%  BMI 24.02 kg/m2  PHYSICAL EXAM  Gen: awake, alert and oriented x3; very nauseous  HEENT: EOMI, no nystagmus; face is symmetric, HB1; Right preauricular incision is clean, dry, and intact; no otorrhea present   Resp: non labored breathing on room air    A/P: Ms. Fisher is a 32yoF POD2 s/p Right middle cranial fossa approach for encephalocele repair with implantation of a titanium sythes plate. A lot of CSF was pulled off intraoperatively. She was lethargic POD1, and CT showed postoperative changes with minimal midline shift. Lumbar drain was pulled in response. She has improved neurologically since that time.  Neuro:   - Keppra per NSG   - Pain meds per SICU   - Head CT from yesterday showed postoperative changes with minimal midline shift. Lumbar drain removed, and neuro status has improved.  HEENT:   - Bacitracin TID to Right preauricular incision  CV:   - HDS, no issues   - PRN hydralazine/labetalol   Resp:   - Stable saturation on room air  GI:   - PRN: reglan, zofran, compazine   - ADAT, encourage PO   - NS @ 50cc/hr  Endo:   - No issues   - Monitor BG  Renal:   - Adequate UOP   - Remove francisco  Heme/ID:   - WBC elevated postoperatively. Low suspicion for infection. Continue to monitor   - Perioperative cefuroxime complete  PPx:   - SCDs  Disp:   - Will transfer to ENT service once transferred to the floor    This patient was discussed with Dr. Mendel Mazariegos MD  Otolaryngology Resident   "

## 2018-01-14 ENCOUNTER — APPOINTMENT (OUTPATIENT)
Dept: OCCUPATIONAL THERAPY | Facility: CLINIC | Age: 33
DRG: 027 | End: 2018-01-14
Attending: OTOLARYNGOLOGY
Payer: COMMERCIAL

## 2018-01-14 ENCOUNTER — APPOINTMENT (OUTPATIENT)
Dept: PHYSICAL THERAPY | Facility: CLINIC | Age: 33
DRG: 027 | End: 2018-01-14
Attending: OTOLARYNGOLOGY
Payer: COMMERCIAL

## 2018-01-14 PROCEDURE — 25000132 ZZH RX MED GY IP 250 OP 250 PS 637: Performed by: NURSE PRACTITIONER

## 2018-01-14 PROCEDURE — 25000132 ZZH RX MED GY IP 250 OP 250 PS 637: Performed by: NEUROLOGICAL SURGERY

## 2018-01-14 PROCEDURE — 97165 OT EVAL LOW COMPLEX 30 MIN: CPT | Mod: GO | Performed by: OCCUPATIONAL THERAPIST

## 2018-01-14 PROCEDURE — 40000193 ZZH STATISTIC PT WARD VISIT

## 2018-01-14 PROCEDURE — 97530 THERAPEUTIC ACTIVITIES: CPT | Mod: GP

## 2018-01-14 PROCEDURE — 40000133 ZZH STATISTIC OT WARD VISIT: Performed by: OCCUPATIONAL THERAPIST

## 2018-01-14 PROCEDURE — 97116 GAIT TRAINING THERAPY: CPT | Mod: GP

## 2018-01-14 PROCEDURE — 40000556 ZZH STATISTIC PERIPHERAL IV START W US GUIDANCE

## 2018-01-14 PROCEDURE — 97161 PT EVAL LOW COMPLEX 20 MIN: CPT | Mod: GP

## 2018-01-14 PROCEDURE — 12000001 ZZH R&B MED SURG/OB UMMC

## 2018-01-14 PROCEDURE — 97535 SELF CARE MNGMENT TRAINING: CPT | Mod: GO | Performed by: OCCUPATIONAL THERAPIST

## 2018-01-14 PROCEDURE — 97112 NEUROMUSCULAR REEDUCATION: CPT | Mod: GP

## 2018-01-14 PROCEDURE — 25000132 ZZH RX MED GY IP 250 OP 250 PS 637: Performed by: STUDENT IN AN ORGANIZED HEALTH CARE EDUCATION/TRAINING PROGRAM

## 2018-01-14 RX ORDER — AMOXICILLIN 250 MG
1-2 CAPSULE ORAL 2 TIMES DAILY PRN
Qty: 50 TABLET | Refills: 0 | Status: SHIPPED | OUTPATIENT
Start: 2018-01-14 | End: 2018-01-23

## 2018-01-14 RX ORDER — ONDANSETRON 4 MG/1
4 TABLET, ORALLY DISINTEGRATING ORAL EVERY 6 HOURS PRN
Qty: 12 TABLET | Refills: 1 | Status: SHIPPED | OUTPATIENT
Start: 2018-01-14 | End: 2018-01-23

## 2018-01-14 RX ORDER — LEVETIRACETAM 1000 MG/1
1000 TABLET ORAL EVERY 12 HOURS
Qty: 8 TABLET | Refills: 0 | Status: SHIPPED | OUTPATIENT
Start: 2018-01-14 | End: 2022-06-14

## 2018-01-14 RX ORDER — MINERAL OIL/HYDROPHIL PETROLAT
OINTMENT (GRAM) TOPICAL PRN
Qty: 50 G | Refills: 1 | Status: SHIPPED | OUTPATIENT
Start: 2018-01-14 | End: 2022-06-14

## 2018-01-14 RX ORDER — OXYCODONE HYDROCHLORIDE 5 MG/1
5-10 TABLET ORAL
Qty: 18 TABLET | Refills: 0 | Status: SHIPPED | OUTPATIENT
Start: 2018-01-14 | End: 2018-01-23

## 2018-01-14 RX ORDER — ACETAMINOPHEN 325 MG/1
650 TABLET ORAL EVERY 4 HOURS PRN
Qty: 30 TABLET | Refills: 1 | Status: SHIPPED | OUTPATIENT
Start: 2018-01-14

## 2018-01-14 RX ORDER — MINERAL OIL/HYDROPHIL PETROLAT
OINTMENT (GRAM) TOPICAL 3 TIMES DAILY
Status: DISCONTINUED | OUTPATIENT
Start: 2018-01-14 | End: 2018-01-15 | Stop reason: HOSPADM

## 2018-01-14 RX ADMIN — LEVETIRACETAM 1000 MG: 500 TABLET ORAL at 08:07

## 2018-01-14 RX ADMIN — FLUOXETINE HYDROCHLORIDE 5 MG: 20 SOLUTION ORAL at 08:07

## 2018-01-14 RX ADMIN — LEVETIRACETAM 1000 MG: 500 TABLET ORAL at 20:11

## 2018-01-14 RX ADMIN — WHITE PETROLATUM: 1.75 OINTMENT TOPICAL at 13:30

## 2018-01-14 RX ADMIN — ACETAMINOPHEN 975 MG: 325 TABLET, FILM COATED ORAL at 08:15

## 2018-01-14 RX ADMIN — SENNOSIDES AND DOCUSATE SODIUM 2 TABLET: 8.6; 5 TABLET ORAL at 08:07

## 2018-01-14 RX ADMIN — WHITE PETROLATUM: 1.75 OINTMENT TOPICAL at 20:12

## 2018-01-14 ASSESSMENT — VISUAL ACUITY
OU: NORMAL ACUITY

## 2018-01-14 ASSESSMENT — ACTIVITIES OF DAILY LIVING (ADL)
IADL_COMMENTS: FAMILY ABLE TO ASSIST PRN
PREVIOUS_RESPONSIBILITIES: MEAL PREP;HOUSEKEEPING;LAUNDRY;DRIVING;WORK

## 2018-01-14 NOTE — PLAN OF CARE
Problem: Patient Care Overview  Goal: Plan of Care/Patient Progress Review  OT 6A: Evaluation complete and treatment provided.  Discharge Planner OT   Patient plan for discharge: home with 24hr assist from parents and OP PT  Current status: Pt alert, oriented and appropriate in conversation. Pt and parents receptive to education on home safety, ADL completion with post surgical precautions, and discharge recommendations, Handout provided to reinforce education  Barriers to return to prior living situation: impaired balance, post surgical precautions  Recommendations for discharge: home with assist and OP PT  Rationale for recommendations: Pt has good family support; is young and was previously active, anticipate good recovery with OP PT services to address balance deficits.    Occupational Therapy Discharge Summary    Reason for therapy discharge:    Discharged to home with outpatient therapy.  All goals and outcomes met, no further needs identified.    Progress towards therapy goal(s). See goals on Care Plan in [facility] electronic health record for goal details.  Goals met    Therapy recommendation(s):    Continued therapy is recommended.  Rationale/Recommendations:  REcommend OP PT to address balance and mobility deficits.           Entered by: Tatyana Moody 01/14/2018 3:00 PM

## 2018-01-14 NOTE — PLAN OF CARE
Problem: Patient Care Overview  Goal: Plan of Care/Patient Progress Review  Discharge Planner PT   Patient plan for discharge: Home with family assist  Current status: Pt eval completed; tx indicated. 5/5 strength bilaterally UE/LE. Impaired balance and unsteady, ataxic gait. Pt mod I supine <>sit and bed mobility due to UE use. Sit <> stand SBA. Pt ambulated 1 x 140 ft with CGA and min A x 1. Pt ambulated 2 x 250 ft with FWW and CGA with no unsteadiness noted. Pt ascend/descend 3 steps and R railing with CGA.    At this time, Pt at elevated fall risk and should ambulate with SBA/CGA x 1 and GB with FWW for all long distance ambulation. Short distances can be SBA x 1 with GB.    Barriers to return to prior living situation: medical needs, craniotomy precautions, impaired functional mobility  Recommendations for discharge: Home with assist/supervision from family and OP physical therapy  Rationale for recommendations: Pt would benefit from supervision and assistance from family for safety with ADLs/IADLs. Pt would benefit from OP physical therapy to improve safe ambulation with least restrictive device, functional strength for transfers, tolerance for daily/work related activities, and address balance deficits.       Entered by: Eric Bradley 01/14/2018 2:07 PM

## 2018-01-14 NOTE — PROGRESS NOTES
[facility], [place]   Neurosurgery Progress Note:    Assessment: Michelle Fisher is a 32 year old female who is postoperative day #3 from right sided encephalocele repair.     Recommendations:  - Serial neuro exams  - Pain control  - Continue Keppra for seizure ppx through 1/18/18  - IS q1 hr while awake   - Aggressive bowel regimen   - PT/OT  - Remainder of cares per primary team, ENT     Discussed with Neurosurgery chief, who agrees.    Interval History: transferred to the floor.  No acute events overnight.       Objective:   Temp:  [97.7  F (36.5  C)-99.5  F (37.5  C)] 98.8  F (37.1  C)  Pulse:  [82] 82  Heart Rate:  [73-92] 86  Resp:  [14-18] 14  BP: (102-122)/(62-78) 122/74  SpO2:  [96 %-100 %] 100 %  I/O last 3 completed shifts:  In: 2279.17 [P.O.:1080; I.V.:1199.17]  Out: 980 [Urine:980]    Gen: Appears comfortable, NAD   Wound: right temporal incision c/d/i with sutures in place, no significant erythema, swelling, or drainage.  Lumbar drain insertion site c/d/i with suture in place.    Neurologic:  - Alert & Oriented to person, place, time, and situation  - Follows commands briskly  - Speech fluent, spontaneous. No aphasia or dysarthria.  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    - No gaze preference. No apparent hemineglect.  - PERRL, EOMI  - Strong eye closure, jaw clench, and cheek puff  - Face symmetric   - Palate elevates symmetrically, uvula midline, tongue protrudes midline  - Strength 5/5 throughout, no pronator drift   Reflexes 2+ throughout  Sensation intact and symmetric to light touch throughout    LABS  reviewed     IMAGING    Reviewed     Please contact neurosurgery resident on call with questions.    Dial * [phone], enter [phone] when prompted.

## 2018-01-14 NOTE — PLAN OF CARE
Problem: Craniotomy/Craniectomy/Cranioplasty (Adult)  Goal: Signs and Symptoms of Listed Potential Problems Will be Absent, Minimized or Managed (Craniotomy/Craniectomy/Cranioplasty)  Signs and symptoms of listed potential problems will be absent, minimized or managed by discharge/transition of care (reference Craniotomy/Craniectomy/Cranioplasty (Adult) CPG).   Outcome: Improving  D/I:Neuros intact. Up with one assist and belt. Unsteady on feet.PO'ing well. Parents instructed on incision cares with NS and aquaphor.Had Mom do it.Back dressing D/I.Voiding spont.  P:Need to check with MD's if they want aquaphor on incision in hair too, or just part on face.

## 2018-01-14 NOTE — PLAN OF CARE
Problem: Patient Care Overview  Goal: Plan of Care/Patient Progress Review  Outcome: No Change  Transfer Note:  D/I: The pt arrived on the unit via w/c and escorted by 4A staff and family around 1815. Able to transfer to bed with SBA of 1. Sat on the edge of the bed and ate supper. Tolerated a regular diet. No c/o nausea. Alert and oriented x 4. Neuros intact except [place] in right ear (baseline). Incision on right side of head open to air. No drainage present and sutures intact. C/o mild pain from the incision, but declined pain medication when offered. Up to the BR x 1. Unsteady on feet when getting oob. Denied feeling dizzy. Bed alarm on and pt reminded to call for assistance. Voiding spontaneously and in adequate amounts. Difficulty swallowing po meds (baseline). Meds given in pudding per pt's request.   P: Continue POC. Alert MD of changes in status.

## 2018-01-14 NOTE — PROGRESS NOTES
01/14/18 1400   Quick Adds   Type of Visit Initial Occupational Therapy Evaluation   Living Environment   Lives With spouse   Living Arrangements house   Home Accessibility bed and bath on same level;stairs to enter home;stairs within home;tub/shower is not walk in   Number of Stairs to Enter Home 3   Number of Stairs Within Home 12  (to basement; pt does not have to use)   Stair Railings at Home (yes)   Transportation Available car;family or friend will provide   Living Environment Comment Pt drives at baseline; family able to provide transportation as needed;  pt lives in a small walk out rambler with her ; Pt parents plan to stay with the pt as primary caregivers upon discharge   Self-Care   Dominant Hand right   Usual Activity Tolerance good   Current Activity Tolerance moderate   Regular Exercise yes   Activity/Exercise Type walking   Exercise Amount/Frequency 3-5 times/wk   Equipment Currently Used at Home none   Activity/Exercise/Self-Care Comment Pt is independent with all ADL/IADLs at baseline;  Pt works 6 days/week at [employer], walks her dogs several times/week for exercise.  Pt parents are retired and able to provide 24hr assist prn, pt  works during the day   Functional Level Prior   Ambulation 0-->independent   Transferring 0-->independent   Toileting 0-->independent   Bathing 0-->independent   Dressing 0-->independent   Eating 0-->independent   Cognition 0 - no cognition issues reported   Fall history within last six months no   Which of the above functional risks had a recent onset or change? ambulation;transferring;bathing       Present no   Language english   General Information   Onset of Illness/Injury or Date of Surgery - Date 01/11/18   Referring Physician Quintin Meier MD   Patient/Family Goals Statement Return to home and work   Additional Occupational Profile Info/Pertinent History of Current Problem Michelle Fisher is a 32 year old female who is postoperative  day #3 from right sided encephalocele repair   Precautions/Limitations (craniotomy precautions)   General Observations Pt pleasant and agreeable; offers few verbalizations; Pt parents present and very attentive and supportive   General Info Comments Activity: ambulate with assist   Cognitive Status Examination   Orientation orientation to person, place and time   Level of Consciousness alert   Able to Follow Commands WNL/WFL   Personal Safety (Cognitive) mild impairment   Memory intact   Cognitive Comment no acute cognitive concerns, pt speech and processing is slow at times though overall appropriate in conversation; both pt and parents report that pt is at baseline cognitively   Visual Perception   Visual Perception Wears glasses   Visual Perception Comments no acute visual changes reported; pt wears glasses at baseline   Pain Assessment   Patient Currently in Pain No   Integumentary/Edema   Integumentary/Edema no deficits were identifed   Range of Motion (ROM)   ROM Comment BUE WNL   Strength   Strength Comments Not formally assessed 2/2 post surgical precautions; pt demonstrates appropriate functional strength in all extremities, strength is symetrical   Mobility   Bed Mobility Bed mobility skill: Sit to supine;Bed mobility skill: Supine to sit   Bed Mobility Skill: Sit to Supine   Level of Salome: Sit/Supine stand-by assist   Bed Mobility Skill: Supine to Sit   Level of Salome: Supine/Sit stand-by assist   Transfer Skill: Sit to Stand   Level of Salome: Sit/Stand contact guard   Tub/Shower Transfer   Tub/Shower Transfer Comments education provided   Balance   Balance Comments impaired - please refer to PT evaluation/progress note   Instrumental Activities of Daily Living (IADL)   Previous Responsibilities meal prep;housekeeping;laundry;driving;work   IADL Comments family able to assist prn   Activities of Daily Living Analysis   Impairments Contributing to Impaired Activities of Daily Living  balance impaired;cognition impaired;post surgical precautions   General Therapy Interventions   Planned Therapy Interventions ADL retraining;IADL retraining;bed mobility training;transfer training   Clinical Impression   Criteria for Skilled Therapeutic Interventions Met yes, treatment indicated   OT Diagnosis decreased activity tolerance and independence with ADLS   Influenced by the following impairments impaired balance and post surgical precautions   Assessment of Occupational Performance 5 or more Performance Deficits   Identified Performance Deficits transfers, toileting, dressing, bathing, community mobility, home management, work   Clinical Decision Making (Complexity) Low complexity   Therapy Frequency (one time eval and treat)   Predicted Duration of Therapy Intervention (days/wks) one time eval and treat   Anticipated Equipment Needs at Discharge shower chair  (grab bar; walker)   Anticipated Discharge Disposition Home with Assist;Home with Outpatient Therapy   Risks and Benefits of Treatment have been explained. Yes   Patient, Family & other staff in agreement with plan of care Yes   Clinical Impression Comments Pt parents plan to provide 24hr assist upon discharge - Pt will benefit from OP PT to address balance deficits   Total Evaluation Time   Total Evaluation Time (Minutes) 5

## 2018-01-14 NOTE — PROGRESS NOTES
"ENT Progress Note  1/14/2018    S: Ms. Fisher is doing well this morning. No acute events overnight, afebrile and vitally stable.  Her neuro status continues to improve. Getting up and out of bed with nursing. PO continues to go well with no nausea or emesis. She is comfortable and is hoping to d/c soon.    O:  /86 (BP Location: Left arm)  Pulse 71  Temp 97.9  F (36.6  C) (Oral)  Resp 16  Ht 1.52 m (4' 11.84\")  Wt 55.5 kg (122 lb 5.7 oz)  LMP 12/27/2017  SpO2 99%  BMI 24.02 kg/m2  PHYSICAL EXAM  Gen: sleeping comfortable, able to wake easily and is conversant, alert and oriented x3  HEENT: EOMI, no nystagmus; face is symmetric, HB1; Right preauricular incision is clean, dry, and intact; no otorrhea present, mild supra incisional tenderness  Resp: non labored breathing on room air  Neuro: her strength is equal bilaterally in the arms and at the distal LE, her sensation is intact bilaterally    A/P: Ms. Fisher is a 32yoF POD2 s/p Right middle cranial fossa approach for encephalocele repair with implantation of a titanium sythes plate. She was lethargic POD1, and CT showed postoperative changes with minimal midline shift. Lumbar drain was pulled in response. She has improved neurologically since that time and continues to make progress towards baseline.     Neuro:   - Keppra (ends 1/18/18), prozac              - Lumbar drain removed, and neuro status has improved  HEENT:   - aquaphor to incision TID, no erythema and minimal tenderness, no otorrhea   CV:   - HDS, no issues   - PRN hydralazine/labetalol   Resp:   - Stable saturation on room air  GI:   - PRN: reglan, zofran, compazine   - ADAT, encourage PO, doing well   - Senna BID   Endo:   - No issues   - Monitor BG  Renal:   - Adequate UOP  Heme/ID:   - no ongoing concerns  PPx:   - SCDs  Therapies:   - PT/OT consults placed  Dispo:   - Pending therapies, likely discharge tomorrow  This patient was discussed with Dr. Mendel Meier, " MD  Otolaryngology Resident

## 2018-01-14 NOTE — PLAN OF CARE
Problem: Craniotomy/Craniectomy/Cranioplasty (Adult)  Goal: Signs and Symptoms of Listed Potential Problems Will be Absent, Minimized or Managed (Craniotomy/Craniectomy/Cranioplasty)  Signs and symptoms of listed potential problems will be absent, minimized or managed by discharge/transition of care (reference Craniotomy/Craniectomy/Cranioplasty (Adult) CPG).   Outcome: No Change  Events: no acute events on shift. Patient transferred to  at 1800.     Assessment: no focal neuro deficits, A&O, not lethargic, up eating in chair, not c/o nausea, tolerating regular diet, ambulating unit SBA, sustaining eye contact. Sinus arrhythmia persists, hemodynamically stable, tolerating RA, LS clear, IS to 1000, adequate uop, francisco out this am, no BM since surgery but passing flatus, no skin issues- incision WDL/sore, no c/o pain.   Activity: SBA.    Plan: discharge within 1-2 days pending post op course.

## 2018-01-14 NOTE — DISCHARGE SUMMARY
Discharge Summary  Michelle Fisher  [id number]  1985    Date of Admission: 1/11/2018  Date of Discharge: 1/15/2018    Admission Diagnosis: Encephalocele (H) [Q01.9]  Discharge Diagnosis: Same    Procedures: 1/12/2018  1.  Lumbar drain placement.   2.  Right-sided temporal craniotomy and repair of the middle fossa floor and dural repair.   3.  Intraoperative monitoring of the facial nerves.     Pathology: N/A    HPI: Michelle Fisher is a 32 year old female with history of a right sided conductive hearing loss who was evaluated in the ENT clinic for surgical repair of an encephalocele. Preoperative workup included imaging studies that demonstrated the following; a very large tegmen defect of mainly tegmen tympani and also a portion of the tegmen mastoideum overlying the ossicle heads and potential dehiscence of the superior semicircular canal on the right, although the scan was not reformatted appropriately to be sure of this.  Additionally, mastoid air cells were opacified.  We compared this with an MRI scan where there is likely encephalocele around the middle ear itself and then CSF density that could also be fluid of another nature filling the mastoid air cells.  The CT revealed some dehiscence of the geniculate ganglion. A physical examination showed encephalocele behind the tympanic membrane on otomicroscopic examination.  This information seemed to be consistent with a deeply insinuated encephalocele within the middle ear space. At our clinic visit, we discussed deeply the risks and benefits of surgical correction of the defect and after all questions were answered the above procedure was consented for.     Hospital Course: The patient was admitted to the hospital and underwent the above mentioned procedure. She tolerated the procedure without any intra- or anderson-operative complications. Please see the operative report for full details of the procedure. The patient was admitted for post-operative monitoring.  Of note, she was a bit lethargic postoperatively. The neurosurgical team, who assisted in the dura repair and other aspects of the operation decided to re scan the patient. There was a mild midline shift and a decision was made to pull her lumbar drain. After word, she began to turn around. She was more awake, started having less nausea, ambulated well at first with assistance, but eventually on her own. At discharge, the patient's pain was well controlled, the patient was voiding on her own, and was ambulating and tolerating a regular diet. Physical therapy evaluated the patient and suggested an outpatient front wheeled walker, and cleared for safe discharge to home with assistance of her parents. She had follow up appointments and understood her treatment plan. She was instructed to call the hospital/her surgeons or present to the ED with any concerns.     Discharge Exam:  Vitals:    01/14/18 1931 01/14/18 2233 01/15/18 0400 01/15/18 0724   BP: 108/62 106/65 112/81 128/81   BP Location: Left arm Left arm Left arm Left arm   Pulse:  66     Resp: 16 16 16 16   Temp: 98.5  F (36.9  C) 97.3  F (36.3  C)  97.3  F (36.3  C)   TempSrc: Oral Oral  Oral   SpO2: 100% 99% 99% 97%   Weight:       Height:           Gen: sleeping comfortable, able to wake easily and is conversant, alert and oriented x3  HEENT: EOMI, no nystagmus; face is symmetric, HB1; Right preauricular incision is clean, dry, and intact; no otorrhea present, mild supra incisional tenderness, sutures in place and appear well  Resp: non labored breathing on room air  Neuro: her strength is equal bilaterally in the arms and at the distal LE, her sensation is intact bilaterally    Discharge Medications:   Michelle Fisher   Home Medication Instructions ANNE MARIE:[id number]    Printed on:01/14/18 101   Medication Information                      acetaminophen (TYLENOL) 325 MG tablet  Take 2 tablets (650 mg) by mouth every 4 hours as needed for other (multimodal  surgical pain management along with NSAIDS and opioid medication as indicated based on pain control and physical function.)             FLUoxetine (PROZAC) 10 MG capsule  Take 5 mg by mouth daily              levETIRAcetam 1000 MG TABS  Take 1,000 mg by mouth every 12 hours for 4 days             mineral oil-hydrophilic petrolatum (AQUAPHOR)  Apply topically as needed             ondansetron (ZOFRAN-ODT) 4 MG ODT tab  Take 1 tablet (4 mg) by mouth every 6 hours as needed for nausea or vomiting             oxyCODONE IR (ROXICODONE) 5 MG tablet  Take 1-2 tablets (5-10 mg) by mouth every 3 hours as needed for other (pain control or improvement in physical function. Hold dose for analgesic side effects.)             senna-docusate (SENOKOT-S;PERICOLACE) 8.6-50 MG per tablet  Take 1-2 tablets by mouth 2 times daily as needed for constipation                   Discharge Procedure Orders  Physical Therapy Referral   Referral Type: Rehab Therapy Physical Therapy     Occupational Therapy Referral   Referral Type: Occupational Therapy     Reason for your hospital stay   Order Comments: You had surgery on the encephalocele on the right side. You were admitted for monitoring.     Activity   Order Comments: Your activity upon discharge: Ad Amanda. No heavy lifting (>20 lbs) for 2 weeks or until your next follow up appointment. Try not to strain for BMs, use the laxative.   Order Specific Question Answer Comments   Is discharge order? Yes      When to contact your care team   Order Comments: Please notify your doctor if you experience wound breakdown, sustained bleeding from the wound site, or increasing redness, swelling, and/or purulent malorodorous discharge from the wound site which may indicate infection. If you feel it is acute, or experience sudden changes in breathing, chest pain, or excessive sleepiness/somnolence please return to the emergency department or call 911. If you have questions or concerns during the day please  "call ENT clinic and [phone]. If at night you can call [facility] at [phone] and ask for the \"ENT resident on call\".     Wound care and dressings   Order Comments: Apply aquaphor to the incision line as needed to keep it moist. Try not to get the sutures too saturated with water in the shower and don't use soap to the incision line.     Adult [facility] Follow-up and recommended labs and tests   Order Comments: Follow up in ENT clinic for suture removal on Friday 1/19/17 for a nursing visit. Please call the clinic to set up a time: [phone].    Follow up in ENT clinic with Dr. Oglesby and Dr. Lama on Tuesday, January 23rd at 2:00PM. Please call the clinic with questions/concerns: [phone].    Otolaryngology/ENT Clinic:  [facility]  [address] [place] [address]     Full Code     Diet   Order Comments: Follow this diet upon discharge: Regular Diet   Order Specific Question Answer Comments   Is discharge order? Yes          Dispo: To home in good condition. All of the patient's questions/concerns have been addressed at this time.     Quintin Meier, PGY-1  Otolaryngology-Head & Neck Surgery  Please contact ENT by dialing [phone] and entering job code [phone].    I, Alexia Oglesby, saw and evaluated this patient prior to discharge. I discussed the patient with the resident and agree with plan of care as documented in the resident note. I personally spent 15 minutes on discharge activities.      "

## 2018-01-14 NOTE — PROGRESS NOTES
01/14/18 1300   Quick Adds   Quick Adds Student Supervision-Eval Only   Type of Visit Initial PT Evaluation   Student Supervision-Eval Only    Student Supervision On-site supervision provided       Present no   Living Environment   Lives With spouse   Living Arrangements house   Home Accessibility bed and bath on same level;stairs (1 railing present);stairs to enter home;stairs within home;tub/shower is not walk in   Number of Stairs to Enter Home 3   Number of Stairs Within Home 12   Stair Railings at Home inside, present on right side;outside, present on right side   Transportation Available family or friend will provide   Living Environment Comment Pt lives in a walkout rambler with her . 3 steps to enter with railing on right. Full flight of stairs to basement with R railing. All needs met on top floor with bedroom and bath. Pt has tub/shower combo with no grab rails and big step to get in tub. Pt has shower head that is removable. Pt has normal size toilet with wall close by for assistance. Pt's mother and father to provide assistance during first few weeks post operatively.   Self-Care   Dominant Hand right   Usual Activity Tolerance good   Current Activity Tolerance moderate   Regular Exercise yes   Activity/Exercise Type walking   Exercise Amount/Frequency 3-5 times/wk   Equipment Currently Used at Home none   Activity/Exercise/Self-Care Comment Pt walks with her dogs a few times a week when it is nice out. Pt works at [employer] and is active based on job duties.   Functional Level Prior   Ambulation 0-->independent   Transferring 0-->independent   Toileting 0-->independent   Bathing 0-->independent   Dressing 0-->independent   Eating 0-->independent   Communication 0-->understands/communicates without difficulty   Swallowing 0-->swallows foods/liquids without difficulty   Cognition 0 - no cognition issues reported   Fall history within last six months no  (Pt has balance  difficulties in the past few weeks.)   Which of the above functional risks had a recent onset or change? none;ambulation   Prior Functional Level Comment Pt IND with all ADLs/IADLs. Pt worked at [employer].   General Information   Onset of Illness/Injury or Date of Surgery - Date 01/11/18   Referring Physician Sophia Chávez MD   Patient/Family Goals Statement Pt and family anticipating discharging home today or tomorrow.   Pertinent History of Current Problem (include personal factors and/or comorbidities that impact the POC) Michelle Fisher is a 32 year old female who is postoperative day #3 from right sided encephalocele repair.    Precautions/Limitations fall precautions;other (see comments)  (craniotomy precautions)   Heart Disease Risk Factors Medical history   General Observations Ambulate with Assist   General Info Comments Lifting restrictions 2/2 craniotomy   Cognitive Status Examination   Orientation orientation to person, place and time   Level of Consciousness alert   Follows Commands and Answers Questions 100% of the time   Personal Safety and Judgment intact   Memory intact   Cognitive Comment Although Pt responds appropriately, Pt slightly inattentive/distracted in session possibly 2/2 feeling tired.  Per Pt and family report, Pt appears to be at baseline cognitively.   Pain Assessment   Patient Currently in Pain No   Integumentary/Edema   Integumentary/Edema no deficits were identifed   Posture    Posture Forward head position   Range of Motion (ROM)   ROM Quick Adds No deficits were identified   ROM Comment WFL estefani UE/LE   Strength   Strength Comments 5/5 estefani UE/LE. Pt had minor impaired coordination with pre-positioning for estefani LE MMT.   Bed Mobility   Bed Mobility Comments Mod I supine<>sit, bed mobility   Transfer Skills   Transfer Comments Sit <> stand SBA   Gait   Gait Comments Pt walks with ataxic gait and imbalance noted. Therapist  CGA x 1 and min A x1 for one LOB to reestablish  "balance.   Balance   Balance Comments Impaired static/dynamic balance.   Sensory Examination   Sensory Perception no deficits were identified   Coordination   Coordination other (see comments)   Coordination Comments Normal AGUILA of hands and toe tapping bilaterally. Impaired coordination with MMT and gait.   General Therapy Interventions   Planned Therapy Interventions balance training;bed mobility training;gait training;neuromuscular re-education;strengthening;transfer training;home program guidelines;progressive activity/exercise   Clinical Impression   Criteria for Skilled Therapeutic Intervention yes, treatment indicated   PT Diagnosis Impaired functional mobility   Influenced by the following impairments Impaired balance and estefani LE coordination   Functional limitations due to impairments Impaired ambulation and stair negotiation   Clinical Presentation Stable/Uncomplicated   Clinical Presentation Rationale Clinical Judgement;PMH   Clinical Decision Making (Complexity) Low complexity   Therapy Frequency` 5 times/week   Predicted Duration of Therapy Intervention (days/wks) 3 days   Anticipated Equipment Needs at Discharge front wheeled walker   Anticipated Discharge Disposition Home with Assist;Home with Outpatient Therapy   Risk & Benefits of therapy have been explained Yes   Patient, Family & other staff in agreement with plan of care Yes   Clinical Impression Comments PT eval completed; tx indicated.   [facility] [employer] TM \"6 Clicks\"   2016, Trustees of [facility], under license to [employer].  All rights reserved.   6 Clicks Short Forms Basic Mobility Inpatient Short Form   [facility] [employer]  \"6 Clicks\" V.2 Basic Mobility Inpatient Short Form   1. Turning from your back to your side while in a flat bed without using bedrails? 4 - None   2. Moving from lying on your back to sitting on the side of a flat bed without using bedrails? 4 - None   3. Moving to and from a bed to a chair " (including a wheelchair)? 4 - None   4. Standing up from a chair using your arms (e.g., wheelchair, or bedside chair)? 4 - None   5. To walk in hospital room? 3 - A Little   6. Climbing 3-5 steps with a railing? 3 - A Little   Basic Mobility Raw Score (Score out of 24.Lower scores equate to lower levels of function) 22   Total Evaluation Time   Total Evaluation Time (Minutes) 8

## 2018-01-15 VITALS
TEMPERATURE: 97.5 F | RESPIRATION RATE: 16 BRPM | WEIGHT: 122.36 LBS | DIASTOLIC BLOOD PRESSURE: 76 MMHG | BODY MASS INDEX: 24.02 KG/M2 | HEART RATE: 66 BPM | OXYGEN SATURATION: 97 % | HEIGHT: 60 IN | SYSTOLIC BLOOD PRESSURE: 112 MMHG

## 2018-01-15 PROCEDURE — 25000132 ZZH RX MED GY IP 250 OP 250 PS 637: Performed by: NEUROLOGICAL SURGERY

## 2018-01-15 PROCEDURE — 25000125 ZZHC RX 250: Performed by: NURSE PRACTITIONER

## 2018-01-15 PROCEDURE — 25000132 ZZH RX MED GY IP 250 OP 250 PS 637: Performed by: NURSE PRACTITIONER

## 2018-01-15 RX ADMIN — LEVETIRACETAM 1000 MG: 500 TABLET ORAL at 08:32

## 2018-01-15 RX ADMIN — WHITE PETROLATUM: 1.75 OINTMENT TOPICAL at 08:39

## 2018-01-15 RX ADMIN — WHITE PETROLATUM: 1.75 OINTMENT TOPICAL at 13:35

## 2018-01-15 RX ADMIN — ACETAMINOPHEN 650 MG: 325 TABLET, FILM COATED ORAL at 08:32

## 2018-01-15 RX ADMIN — FLUOXETINE HYDROCHLORIDE 5 MG: 20 SOLUTION ORAL at 08:32

## 2018-01-15 RX ADMIN — ONDANSETRON 4 MG: 4 TABLET, ORALLY DISINTEGRATING ORAL at 13:21

## 2018-01-15 ASSESSMENT — VISUAL ACUITY
OU: NORMAL ACUITY

## 2018-01-15 NOTE — PLAN OF CARE
Patient discharged home this evening at 1730. Patient is stable and denied nausea after last meal. Discharge instructions were gone over with patient and mother. Patient confirmed understanding of wound care and follow up appointments. Patient will  medications on her way out to her car.

## 2018-01-15 NOTE — OP NOTE
DATE OF PROCEDURE:  01/11/2018.      PREOPERATIVE DIAGNOSIS:  Right temporal lobe encephalocele with conductive hearing loss.      POSTOPERATIVE DIAGNOSIS:  Right temporal lobe encephalocele with conductive hearing loss.      PROCEDURES:   1.  Right middle cranial fossa approach for repair of encephalocele.   2.  Continuous facial nerve monitoring.       ATTENDING SURGEON:   Alexia Oglesby MD      RESIDENT SURGEON:  Pebbles To MD      ANESTHESIA:  General.      ESTIMATED BLOOD LOSS:  See anesthesia case record.      SPECIMENS:  None.      DRAINS:  Lumbar drain placed.      INDICATIONS FOR PROCEDURE:  Ms. Michelle iFsher is a 32-year-old woman who has conductive hearing loss in right ear and evidence of mass behind the tympanic membrane that appears to be consistent with brain tissue.  CT and MRI imaging were performed confirming the presence of encephalocele in the middle ear space as well as fluid density consistent with CSF in the mastoid air cell system.  There is a large tegmen dehiscence overlying the middle ear and mastoid system as well confirmed.  We discussed common and serious risks of the attending procedure for repair and the patient indicated that she would like to proceed.      INTRAOPERATIVE FINDINGS:  There were multiple small protrusions of arachnoid versus encephalocele throughout the middle fossa floor, but 3 distinct encephaloceles.  There was a small encephalocele in region of mastoid air cell system posteriorly, not contiguous with labyrinth, then a very large tegmen defect with encephalocele overlying the ossicular heads that worked its way down between the ossicles underneath the tympanic membrane, and is both anterior and posterior to the malleus and medial and lateral to the incus.  There was clear fluid filling the mastoid air cell system when the posterior aspect of the encephalocele was removed from its location there, and then there is an additional defect over the top of the  eustachian tube in its anterior course where there is no bone and a defect in the dura lateral to this as well.  The superior aspect of the encephalocele in the middle ear could be removed, however, after significant effort the most inferior aspects that are around the stapes in the inferior ossicular chain and hypotympanum could not be removed through the superior approach.  Also, important to note is that the patient had natural bone erosion in dehiscence of the lateral aspect of the labyrinthine portion of the facial nerve as well as the entire geniculate ganglion and the proximal aspect of the tympanic segment of facial nerve.  There is no bone over these structures.  The greater superficial petrosal nerve was preserved, but elevated out of bony channel to allow for placement of the repair, but it was not truncated.  Additionally, the dome of the superior semicircular canal as well as the lateral semicircular canal had no bone over their superior aspects, however, the superior semicircular canal is not dehiscent.  With water running along the superior canal, one can visualize a blue lining effect, but it is not dehiscent and this did not require repair.      DESCRIPTION OF PROCEDURE:  The patient was brought to the operating room, placed supine on the operating room table.  General endotracheal anesthesia was administered.  The table was turned 180 degrees.  A lumbar drain was placed by the Neurosurgery Service.  We prepped and draped the patient such that there were facial nerve monitoring electrodes within the right orbicularis oculi and orbicularis oris, and a straight incision was designed in front of the ear traveling up into the scalp.  The tap test was performed, impedances were checked and verified, and the facial nerve monitor was functioning properly during the procedure.  The incision was anesthetized with 1% lidocaine with 1:100,000 epinephrine.  The ear was prepped sterilely and draped and the  Neurosurgical Service performed the incision and craniotomy and this will be dictated under separate cover.  Following their middle fossa craniotomy, I entered the procedure.      Using the surgical microscope, the dura was elevated from the middle fossa floor working posteriorly to anteriorly and lateral to medially.  In elevating, we identified the defects as detailed in the findings above and in each instance gently elevated encephalocele out of small defects, identified the arachnoid granulations or arachnoid protrusions, and then in reaching the very large defect over the middle ear space, elevated this and then used the bipolar to truncate the tissue which did not in any way appear viable in that region, and then continued the medial and anterior dissection identifying the additional defect over the eustachian tube orifice.  The petrous ridge was fully identified as was the plane of the superior semicircular canal, and the dura was very gently elevated under direct visualization off of the tympanic segment geniculate ganglion and labyrinthine segment of facial nerve, which were very obvious structures during this dissection.  The GSPN was similarly identified.  At that moment I stimulated the nerve with the facial nerve stimulator and it did not stimulate as expected.  Inquiry with the Anesthesia Service determined that they had recently given the patient a paralytic agent. They reversed the paralysis immediately and the facial nerve stimulated normally at an appropriately low level of stimulation of 0.05 milliamps. This stimulation was confirmed to also be present at the conclusion of the procedure.  Next, dissection was performed of the encephalocele over the middle ear space.  There were some very thin bony spicules that were not particularly viable around the base of it and these were gently elevated allowing additional access.  Using cups and suction, pieces were removed, but it was clearly deeply  insinuated.  Therefore, shayna bur was used to remove some of the lateral edges of the bone to allow additional access.  We could visualize the entire malleus head and incus body as expected.  Natural openings into the mastoid were large and there was not significant brain in this region, just clear fluid had been suctioned previously.  Additional work was performed both anterior to the malleus head as the encephalocele tracked around the cochlear form process medially as well both lateral and medial to the incus body to remove additional tissue.  However, we could not safely get the inferior aspects as there was no desire to disrupt stapes.  Thus the top half of the filled middle ear was evacuated but the inferior aspects of residual encephalocele remain and will need to be removed under separate transcanal procedure if hearing does not go back to desirable levels.  The entire region was copiously irrigated and inspected, and then the Neurosurgical Service returned.  Together, we designed a repair including titanium mesh plate, which they will dictate separately, and this was placed taking care not to allow it to contact the facial nerve and is lateral to the facial nerve with cutout for facial nerve.  Additionally, the repair is placed superior to this and primary repair of all dural openings was also performed with sutures, and they will dictate this as well.  The middle fossa dura was allowed to reassume an anatomic position and they placed tack-up sutures and repaired the craniotomy and incision.      The patient at the end of the procedure was turned back over to Anesthesia in stable condition.  I, Alexia Oglesby MD, the attending physician was present and I performed all portions of the neuro-otologic procedure.         ALEXIA OGLESBY MD             D: 2018 13:41   T: 01/15/2018 03:06   MT: anastasiia      Name:     EUN CASTILLO   MRN:      [id number]        Account:        [id number]   :       1985           Procedure Date: 01/11/2018      Document: [id number]

## 2018-01-15 NOTE — PLAN OF CARE
Problem: Patient Care Overview  Goal: Plan of Care/Patient Progress Review  Outcome: No Change  [id number]  VSS, on room air. Denies pain. Neuros unchanged. Uses call light appropriately. Incision to scalp CDI, PURA. Up with SBA to restroom. IVF infused for a short period of time, patient thought it was necessary; disconnected after looking at orders. PIV R upper arm SL. No changes to phlebitis on L FA. Rested between cares. No acute issues; continue to monitor and follow POC. Possible discharge today.

## 2018-01-15 NOTE — PLAN OF CARE
Problem: Patient Care Overview  Goal: Plan of Care/Patient Progress Review  Outcome: Declining  D/I:Had emesis this AM after breakfast. Reported that she was dizzy and burped, then had emesis across bathroom.Had no nausea it just happened. Team was notified. No neuro changes. Took a shower and she felt better. Up with SBA, steady on feet.  Had her order lunch later and she had a few bites and called at 1:20 pm called to say that she was dizzy again and nauseated.Gave her Zofran and MD's were notified and came to see her. No neuro changes.Incision D/I. Denies pain.  P:Advance diet as tolerated. Poss DC later if feeling better?Team was going to call Staff MD.

## 2018-01-15 NOTE — PROGRESS NOTES
[facility], [place]   Neurosurgery Progress Note:    Assessment: Michelle Fisher is a 32 year old female who is postoperative day #4 from right sided encephalocele repair.     Recommendations:  - Serial neuro exams  - Pain control  - Continue Keppra for seizure ppx through 1/18/18  - IS q1 hr while awake   - Aggressive bowel regimen   - PT/OT  - Remainder of cares per primary team, ENT     Discussed with Neurosurgery chief, who agrees.    Interval History: OK to DC from neurosurgery perspective.       Objective:   Temp:  [97.3  F (36.3  C)-98.5  F (36.9  C)] 97.3  F (36.3  C)  Pulse:  [66] 66  Heart Rate:  [68-81] 81  Resp:  [16] 16  BP: (106-128)/(62-83) 128/81  SpO2:  [97 %-100 %] 97 %  I/O last 3 completed shifts:  In: 1290.83 [P.O.:840; I.V.:450.83]  Out: -     Gen: Appears comfortable, NAD   Wound: right temporal incision c/d/i with sutures in place, no significant erythema, swelling, or drainage.  Lumbar drain insertion site c/d/i with suture in place.    Neurologic:  - Alert & Oriented to person, place, time, and situation  - Follows commands briskly  - Speech fluent, spontaneous. No aphasia or dysarthria.  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    - No gaze preference. No apparent hemineglect.  - PERRL, EOMI  - Strong eye closure, jaw clench, and cheek puff  - Face symmetric   - Palate elevates symmetrically, uvula midline, tongue protrudes midline  - Strength 5/5 throughout, no pronator drift   Reflexes 2+ throughout  Sensation intact and symmetric to light touch throughout    LABS  reviewed     IMAGING    Reviewed     Please contact neurosurgery resident on call with questions.    Dial * * [phone], enter [phone] when prompted.

## 2018-01-15 NOTE — PLAN OF CARE
Problem: Patient Care Overview  Goal: Plan of Care/Patient Progress Review  Outcome: No Change  A&O. VSS. Neuros intact. Up A1 and GB, unsteady on feet. PO'ing well. Back dressing D/I. Voiding spont. Aquaphor only on part of incision by ear. LPIV noted phlebitis grade 3, hospital policy followed. Plan to d/c tomorrow. Continue with POC.

## 2018-01-16 NOTE — PLAN OF CARE
Problem: Patient Care Overview  Goal: Plan of Care/Patient Progress Review  PT 6A: Attempted in PM. Pt's mother states, pt was just nauseas and need rest.  Unable to try back.      Physical Therapy Discharge Summary    Reason for therapy discharge:    Discharged to home with outpatient therapy.    Progress towards therapy goal(s). [facility] for goal details.  Goals partially met.  Barriers to achieving goals:   discharge from facility.    Therapy recommendation(s):    Continued therapy is recommended.  Rationale/Recommendations:   Pt would benefit from supervision and assistance from family for safety with ADLs/IADLs. Pt would benefit from OP physical therapy to improve safe ambulation with least restrictive device, functional strength for transfers, tolerance for daily/work related activities, and address balance deficits.

## 2018-01-21 ENCOUNTER — HEALTH MAINTENANCE LETTER (OUTPATIENT)
Age: 33
End: 2018-01-21

## 2018-01-23 ENCOUNTER — OFFICE VISIT (OUTPATIENT)
Dept: OTOLARYNGOLOGY | Facility: CLINIC | Age: 33
End: 2018-01-23
Payer: COMMERCIAL

## 2018-01-23 DIAGNOSIS — Q01.8 TEMPORAL ENCEPHALOCELE (H): Primary | ICD-10-CM

## 2018-01-23 ASSESSMENT — PAIN SCALES - GENERAL
PAINLEVEL: EXTREME PAIN (9)
PAINLEVEL: EXTREME PAIN (9)

## 2018-01-23 NOTE — PROGRESS NOTES
I had the pleasure of seeing Michelle Fisher in postoperative followup today.      HISTORY OF PRESENT ILLNESS:  She is a 32-year-old woman who underwent a right middle cranial fossa approach for encephalocele on 1/14/2018.  In the postoperative time frame, Michelle has returned home.  She reports today that as she has been recovering, she has noted challenges with sleep and also feels some dizziness.  She is not using narcotic pain medication.  She does have some Advil and Tylenol available and has been crushing it in pudding, but has not been using it before bed.  She finds that when she goes to sleep at night, she is awakened by some sharp pains and then falls asleep and wakes up again because of that.  They radiate up into the incision site.  She has some tenderness behind the mastoid related to a lymph node that was swollen there.  The ear has a general full feeling.   In addition to having poor sleep, she is having this intermittent pain and has reported difficulties with some dizziness.  She was evaluated in the hospital and at times with the physical therapist was thought to be that was doing quite well, and then they also recommended because she felt unstable, to have an additional point of support when she was home.  She reports a variety of symptoms including a feeling of lightheadedness and dizziness, but not vertigo when she can be lying flat and wakes up in the morning, when she sits up, when she goes from sitting to standing, and then after walking around the block and making some turns.  As we talked today, it was difficult to exactly pinpoint when this began, but she did state a bit later in the encounter, that she was having problems with this before the surgery too.     Patient Supplied Answers to Review of Systems   ENT ROS 1/23/2018   Constitutional -   Neurology Dizzy spells   Psychology -   Ears, Nose, Throat Ear pain, Ringing/noise in ears   Gastrointestinal/Genitourinary Heartburn/indigestion    Musculoskeletal Neck pain   Other Problems with anesthesia in surgery        PHYSICAL EXAMINATION:  She appeared a bit fatigued.  The incision is healing beautifully and the stitches were removed.  The tympanic membrane is intact.  There is a serous effusion and presence of the anterior aspect of the encephalocele which is pink underneath the drum.  She has normal facial nerve function.  I palpated the lymph node and it is about a centimeter and a half, is a little bit tender along the sternocleidomastoid.  We also had her ambulate.  She is quite steady ambulating, but she did stop during the task to say that she felt dizziness.  I performed a Shevlin-Hallpike that was negative, although she said that she had the exact symptoms that she usually gets, there was no nystagmus on sitting or lying back in this position on the right side.      IMPRESSION AND PLAN:  Michelle is healing as expected following the surgery.  I think it sounds like she had some dizziness before the surgery and with the stress of this, it may be magnified at this moment.  We were not in the inner ear disrupting it at the time, so we would recommend that she just maintain her level of activity and if she would like to get her endurance back with some physical therapy, we are certainly supportive of that.  I have also recommended that they get liquid Tylenol and Advil as I think the crushing may have been activating it based on the formulation she had and we discussed a way to alternate these to achieve better control.  We had talked about using popsicles sticks to help stretch the jaw after the surgery, but for unclear reasons, I do not think she has been working on that at home.   We provided some additional ones today and talked about how to approach getting her jaw to open better, and make chewing more comfortable over time.  Overall, I think it is pretty expected postoperative course and there are some small changes we can make to help her feel  even better. She is also welcome to shower and wash her hair and I think she will feel better doing that as well.        We will plan to see her back in the clinic in about two months with a CT that day and audiogram as it will take about that long for any fluid from the surgery to clear up.  We will be happy to see her sooner if other challenges arise.      MA/ms

## 2018-01-23 NOTE — MR AVS SNAPSHOT
After Visit Summary   1/23/2018    Michelle Fisehr    MRN: [id number]           Patient Information     Date Of Birth          1985        Visit Information        Provider Department      1/23/2018 2:00 PM Ramirez Lama MD [facility] Ear Nose and Throat        Today's Diagnoses     Temporal encephalocele (H)    -  1       Follow-ups after your visit        Your next 10 appointments already scheduled     Mar 20, 2018  2:00 PM CDT   CT TEMPORAL ORBITAL SELLA W/O CONTRAST with UCCT1   [facility] [place] [facility] ([facility])    [address]  [address] [facility] [id number] [phone]           Please bring any scans or X-rays taken at other hospitals, if similar tests were done. Also bring a list of your medicines, including vitamins, minerals and over-the-counter drugs. It is safest to leave personal items at home.  Be sure to tell your doctor:   If you have any allergies.   If there s any chance you are pregnant.   If you are breastfeeding.   If you have any special needs.  You do not need to do anything special to prepare.  Please wear loose clothing, such as a sweat suit or jogging clothes. Avoid snaps, zippers and other metal. We may ask you to undress and put on a hospital gown.            Mar 20, 2018  2:30 PM CDT   Walk In From ENT with Yajaira Messina   [facility] Audiology ([facility])    [address] [place] [id number] [phone]            Mar 20, 2018  4:00 PM CDT   (Arrive by 3:45 PM)   RETURN CRANIOFACIAL SKULL BASE with Alexia Oglesby MD   [facility] Ear Nose and Throat ([facility])    [address] [place] [id number] [phone]            Mar 20, 2018  4:00 PM CDT   (Arrive by 3:45 PM)   RETURN CRANIOFACIAL SKULL BASE with Ramirez Lama MD   [facility] Ear Nose and Throat ([facility])    [facility]  [address] [id number] [phone]              Who to contact     Please call your clinic at [phone] to:    Ask questions about your health    Make or cancel appointments    Discuss your medicines    Learn about your test results    Speak to your doctor   If you have compliments or concerns about an experience at your clinic, or if you wish to file a complaint, please contact [facility] Patient Relations at [phone] or email us at [email or web address]         Additional Information About Your Visit        A-Gashart Information     AdorStyle gives you secure access to your electronic health record. If you see a primary care provider, you can also send messages to your care team and make appointments. If you have questions, please call your primary care clinic.  If you do not have a primary care provider, please call [phone] and they will assist you.      AdorStyle is an electronic gateway that provides easy, online access to your medical records. With AdorStyle, you can request a clinic appointment, read your test results, renew a prescription or communicate with your care team.     To access your existing account, please contact your [facility] or call [phone] for assistance.        Care EveryWhere ID     This is your Care EveryWhere ID. This could be used by other organizations to access your [place] medical records  [id number]        Your Vitals Were     Last Period                   12/27/2017            Blood Pressure from Last 3 Encounters:   01/15/18 112/76   12/12/17 111/76    Weight from Last 3 Encounters:   01/13/18 55.5 kg (122 lb 5.7 oz)   12/12/17 55.8 kg (123 lb)   12/12/17 55.8 kg (123 lb 1.6 oz)              Today, you had the following     No orders found for display       Primary Care Provider Fax #    Physician No Ref-Primary [phone]       No address on file        Equal  Access to Services     [facility]: Hadii aad ku hadanneliesescot Lindasesar, wajaguarda luqadaha, qafransiscata kaborisaleisha sylvester. So [facility] [phone].    ATENCIÓN: Si habla margaret, tiene a terry disposición servicios gratuitos de asistencia lingüística. Llame al [phone].    We comply with applicable federal civil rights laws and [place] laws. We do not discriminate on the basis of race, color, national origin, age, disability, sex, sexual orientation, or gender identity.            Thank you!     Thank you for choosing [facility] EAR NOSE AND THROAT  for your care. Our goal is always to provide you with excellent care. Hearing back from our patients is one way we can continue to improve our services. Please take a few minutes to complete the written survey that you may receive in the mail after your visit with us. Thank you!             Your Updated Medication List - Protect others around you: Learn how to safely use, store and throw away your medicines at www.disposemymeds.org.          This list is accurate as of 1/23/18 11:59 PM.  Always use your most recent med list.                   Brand Name Dispense Instructions for use Diagnosis    acetaminophen 325 MG tablet    TYLENOL    30 tablet    Take 2 tablets (650 mg) by mouth every 4 hours as needed for other (multimodal surgical pain management along with NSAIDS and opioid medication as indicated based on pain control and physical function.)    Temporal encephalocele (H)       levETIRAcetam 1000 MG Tabs     8 tablet    Take 1,000 mg by mouth every 12 hours for 4 days    Temporal encephalocele (H)       mineral oil-hydrophilic petrolatum     50 g    Apply topically as needed    Temporal encephalocele (H)       order for DME     1 each    Equipment being ordered: Walker Wheels () and Walker () Treatment Diagnosis: unsteady ambulation s/p crani    Encephalocele (H)       PROZAC 10 MG capsule   Generic drug:  FLUoxetine       Take 5 mg by mouth daily

## 2018-01-23 NOTE — MR AVS SNAPSHOT
After Visit Summary   1/23/2018    Michelle Fisher    MRN: [id number]           Patient Information     Date Of Birth          1985        Visit Information        Provider Department      1/23/2018 2:00 PM Alexia Oglesby MD [facility] Ear Nose and Throat        Today's Diagnoses     Temporal encephalocele (H)    -  1      Care Instructions    1. Please follow-up in clinic in 2 months with repeat audiogram and CT scan prior.   2. Please call the ENT clinic with any questions,concerns, new or worsening symptoms.    -Clinic number is [phone]   - Rylie's direct line (Dr. Oglesby and Daina' nurse) [phone]              Follow-ups after your visit        Future tests that were ordered for you today     Open Future Orders        Priority Expected Expires Ordered    CT Temporal orbital sella w/o contrast Routine  1/23/2019 1/23/2018            Who to contact     Please call your clinic at [phone] to:    Ask questions about your health    Make or cancel appointments    Discuss your medicines    Learn about your test results    Speak to your doctor   If you have compliments or concerns about an experience at your clinic, or if you wish to file a complaint, please contact [facility] Patient Relations at [phone] or email us at [email or web address]         Additional Information About Your Visit        MyChart Information     MI Airlinet gives you secure access to your electronic health record. If you see a primary care provider, you can also send messages to your care team and make appointments. If you have questions, please call your primary care clinic.  If you do not have a primary care provider, please call [phone] and they will assist you.      MySQUAR is an electronic gateway that provides easy, online access to your medical records. With MySQUAR, you can request a clinic appointment, read your test results, renew a prescription or  communicate with your care team.     To access your existing account, please contact your [facility] or call [phone] for assistance.        Care EveryWhere ID     This is your Care EveryWhere ID. This could be used by other organizations to access your [place] medical records  [id number]        Your Vitals Were     Last Period                   12/27/2017            Blood Pressure from Last 3 Encounters:   01/15/18 112/76   12/12/17 111/76    Weight from Last 3 Encounters:   01/13/18 55.5 kg (122 lb 5.7 oz)   12/12/17 55.8 kg (123 lb)   12/12/17 55.8 kg (123 lb 1.6 oz)               Primary Care Provider Fax #    Physician No Ref-Primary [phone]       No address on file        Equal Access to Services     [facility] : Emil machucao Sosesar, waaxda luqadaha, qaybta kaalmada adeegyada, aleisha cesar . So [facility] [phone].    ATENCIÓN: Si habla español, tiene a terry disposición servicios gratuitos de asistencia lingüística. Llame al [phone].    We comply with applicable federal civil rights laws and [place] laws. We do not discriminate on the basis of race, color, national origin, age, disability, sex, sexual orientation, or gender identity.            Thank you!     Thank you for choosing [facility] EAR NOSE AND THROAT  for your care. Our goal is always to provide you with excellent care. Hearing back from our patients is one way we can continue to improve our services. Please take a few minutes to complete the written survey that you may receive in the mail after your visit with us. Thank you!             Your Updated Medication List - Protect others around you: Learn how to safely use, store and throw away your medicines at www.disposemymeds.org.          This list is accurate as of: 1/23/18  2:23 PM.  Always use your most recent med list.                   Brand Name Dispense Instructions for use Diagnosis    acetaminophen 325 MG  tablet    TYLENOL    30 tablet    Take 2 tablets (650 mg) by mouth every 4 hours as needed for other (multimodal surgical pain management along with NSAIDS and opioid medication as indicated based on pain control and physical function.)    Temporal encephalocele (H)       levETIRAcetam 1000 MG Tabs     8 tablet    Take 1,000 mg by mouth every 12 hours for 4 days    Temporal encephalocele (H)       mineral oil-hydrophilic petrolatum     50 g    Apply topically as needed    Temporal encephalocele (H)       order for DME     1 each    Equipment being ordered: Walker Wheels () and Walker () Treatment Diagnosis: unsteady ambulation s/p crani    Encephalocele (H)       PROZAC 10 MG capsule   Generic drug:  FLUoxetine      Take 5 mg by mouth daily

## 2018-01-23 NOTE — LETTER
1/23/2018       RE: Michelle Fisher  [address]  [place] [address]     Dear Colleague,    Thank you for referring your patient, Michelle Fisher, to the [facility] EAR NOSE AND THROAT at [facility]. Please see a copy of my visit note below.    I had the pleasure of seeing Michelle Fisher in postoperative followup today.      HISTORY OF PRESENT ILLNESS:  She is a 32-year-old woman who underwent a right middle cranial fossa approach for encephalocele on 1/14/2018.  In the postoperative time frame, Michelle has returned home.  She reports today that as she has been recovering, she has noted challenges with sleep and also feels some dizziness.  She is not using narcotic pain medication.  She does have some Advil and Tylenol available and has been crushing it in pudding, but has not been using it before bed.  She finds that when she goes to sleep at night, she is awakened by some sharp pains and then falls asleep and wakes up again because of that.  They radiate up into the incision site.  She has some tenderness behind the mastoid related to a lymph node that was swollen there.  The ear has a general full feeling.   In addition to having poor sleep, she is having this intermittent pain and has reported difficulties with some dizziness.  She was evaluated in the hospital and at times with the physical therapist was thought to be that was doing quite well, and then they also recommended because she felt unstable, to have an additional point of support when she was home.  She reports a variety of symptoms including a feeling of lightheadedness and dizziness, but not vertigo when she can be lying flat and wakes up in the morning, when she sits up, when she goes from sitting to standing, and then after walking around the block and making some turns.  As we talked today, it was difficult to exactly pinpoint when this began, but she did state a bit later in the encounter, that she was having  problems with this before the surgery too.     Patient Supplied Answers to Review of Systems   ENT ROS 1/23/2018   Constitutional -   Neurology Dizzy spells   Psychology -   Ears, Nose, Throat Ear pain, Ringing/noise in ears   Gastrointestinal/Genitourinary Heartburn/indigestion   Musculoskeletal Neck pain   Other Problems with anesthesia in surgery        PHYSICAL EXAMINATION:  She appeared a bit fatigued.  The incision is healing beautifully and the stitches were removed.  The tympanic membrane is intact.  There is a serous effusion and presence of the anterior aspect of the encephalocele which is pink underneath the drum.  She has normal facial nerve function.  I palpated the lymph node and it is about a centimeter and a half, is a little bit tender along the sternocleidomastoid.  We also had her ambulate.  She is quite steady ambulating, but she did stop during the task to say that she felt dizziness.  I performed a Cynthia-Hallpike that was negative, although she said that she had the exact symptoms that she usually gets, there was no nystagmus on sitting or lying back in this position on the right side.      IMPRESSION AND PLAN:  Michelle is healing as expected following the surgery.  I think it sounds like she had some dizziness before the surgery and with the stress of this, it may be magnified at this moment.  We were not in the inner ear disrupting it at the time, so we would recommend that she just maintain her level of activity and if she would like to get her endurance back with some physical therapy, we are certainly supportive of that.  I have also recommended that they get liquid Tylenol and Advil as I think the crushing may have been activating it based on the formulation she had and we discussed a way to alternate these to achieve better control.  We had talked about using popsicles sticks to help stretch the jaw after the surgery, but for unclear reasons, I do not think she has been working on that at  home.   We provided some additional ones today and talked about how to approach getting her jaw to open better, and make chewing more comfortable over time.  Overall, I think it is pretty expected postoperative course and there are some small changes we can make to help her feel even better. She is also welcome to shower and wash her hair and I think she will feel better doing that as well.        We will plan to see her back in the clinic in about two months with a CT that day and audiogram as it will take about that long for any fluid from the surgery to clear up.  We will be happy to see her sooner if other challenges arise.      MA/ms           Again, thank you for allowing me to participate in the care of your patient.      Sincerely,    Alexia Oglesby MD

## 2018-01-23 NOTE — NURSING NOTE
Chief Complaint   Patient presents with     RECHECK     Return RTN Craniofacial Skullbase Post op 1/11/2018 Encelhalocele repair.        Pt states pain of 9 behind right ear up into the area of her skull where she had surgery and down into right jaw.  Pt also states a lump behind her right ear.    SANDRA Nath LPN

## 2018-01-23 NOTE — LETTER
2018       RE: Michelle Castillo  [address]  [place] [address]     Dear Colleague,    Thank you for referring your patient, Michelle Castillo, to the [facility] EAR NOSE AND THROAT at [facility]. Please see a copy of my visit note below.    Service Date: 2018      Michelle is seen now couple weeks following a middle fossa repair of an encephalocele.  From the neurosurgical point of view, she is bright and alert, not complaining on unanticipated headache.  Her incision has healed quite nicely.  There has been no evidence of CSF leakage.      She has some difficult to understand complaints about feeling unsteady when walking.      We have encouraged her that she is making good progress.  We recommended that she wash her hair and work progressively on increasing her strength and endurance.  Some changes were recommended in her pain medication which is Tylenol and Advil and our plan is to see her in about 2 months.         ALVIN CRUZ MD             D: 2018   T: 2018   MT: CRISTIANO      Name:     MICHELLE CASTILLO   MRN:      [id number]        Account:      [id number]   :      1985           Service Date: 2018      Document: [id number]

## 2018-01-23 NOTE — PATIENT INSTRUCTIONS
1. Please follow-up in clinic in 2 months with repeat audiogram and CT scan prior.   2. Please call the ENT clinic with any questions,concerns, new or worsening symptoms.    -Clinic number is [phone]   - Rylie's direct line (Dr. Oglesby and Daina' nurse) [phone]

## 2018-01-23 NOTE — NURSING NOTE
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 Pt states \"pain of 9 behind Right ear and up to where her surgery was\"      Pt states \"lump behind Right ear and down to the jaw area\"     SANDRA Nath LPN    "

## 2018-01-23 NOTE — NURSING NOTE
Patient's incision site was clean,dry and intact without evidence of redness,sweling or drainage at site. Incision site was cleansed and sutures were removed without difficulty. Patient and mom were educated on site care and signs of infection. Both verbalized understanding and they were encouraged to call with further questions or concerns.     Rylie Barker RN, BSN

## 2018-01-24 NOTE — PROGRESS NOTES
Service Date: 2018      Michelle is seen now couple weeks following a middle fossa repair of an encephalocele.  From the neurosurgical point of view, she is bright and alert, not complaining on unanticipated headache.  Her incision has healed quite nicely.  There has been no evidence of CSF leakage.      She has some difficult to understand complaints about feeling unsteady when walking.      We have encouraged her that she is making good progress.  We recommended that she wash her hair and work progressively on increasing her strength and endurance.  Some changes were recommended in her pain medication which is Tylenol and Advil and our plan is to see her in about 2 months.         ALVIN CRUZ MD             D: 2018   T: 2018   MT: CRISTIANO      Name:     MICHELLE CASTILLO   MRN:      [id number]        Account:      [id number]   :      1985           Service Date: 2018      Document: [id number]

## 2018-03-19 DIAGNOSIS — Q01.8 TEMPORAL ENCEPHALOCELE (H): Primary | ICD-10-CM

## 2018-03-20 ENCOUNTER — OFFICE VISIT (OUTPATIENT)
Dept: AUDIOLOGY | Facility: CLINIC | Age: 33
End: 2018-03-20
Payer: COMMERCIAL

## 2018-03-20 ENCOUNTER — OFFICE VISIT (OUTPATIENT)
Dept: OTOLARYNGOLOGY | Facility: CLINIC | Age: 33
End: 2018-03-20
Payer: COMMERCIAL

## 2018-03-20 VITALS — BODY MASS INDEX: 24.94 KG/M2 | WEIGHT: 127 LBS | HEIGHT: 60 IN

## 2018-03-20 DIAGNOSIS — H90.11 CONDUCTIVE HEARING LOSS OF RIGHT EAR WITH UNRESTRICTED HEARING OF LEFT EAR: Primary | ICD-10-CM

## 2018-03-20 DIAGNOSIS — Q01.9 ENCEPHALOCELE (H): Primary | ICD-10-CM

## 2018-03-20 DIAGNOSIS — H90.11 CONDUCTIVE HEARING LOSS OF RIGHT EAR WITH UNRESTRICTED HEARING OF LEFT EAR: ICD-10-CM

## 2018-03-20 ASSESSMENT — PAIN SCALES - GENERAL: PAINLEVEL: NO PAIN (0)

## 2018-03-20 NOTE — NURSING NOTE
"Chief Complaint   Patient presents with     RECHECK     2 month follow up with Audio and CT     Height 1.53 m (5' 0.24\"), weight 57.6 kg (127 lb).    Michael Bowman LPN    "

## 2018-03-20 NOTE — MR AVS SNAPSHOT
After Visit Summary   3/20/2018    Michelle Fisher    MRN: [id number]           Patient Information     Date Of Birth          1985        Visit Information        Provider Department      3/20/2018 2:30 PM Federica Melendez AuD [facility] Audiology        Today's Diagnoses     Conductive hearing loss of right ear with unrestricted hearing of left ear    -  1       Follow-ups after your visit        Your next 10 appointments already scheduled     Mar 20, 2018  4:00 PM CDT   (Arrive by 3:45 PM)   RETURN CRANIOFACIAL SKULL BASE with Alexia Oglesby MD   [facility] Ear Nose and Throat ([facility])    [address] [place] [id number] [phone]            Mar 20, 2018  4:00 PM CDT   (Arrive by 3:45 PM)   RETURN CRANIOFACIAL SKULL BASE with Ramirez Lama MD   [facility] Ear Nose and Throat [facility])    [address] [place] [id number] [phone]              Who to contact     Please call your clinic at [phone] to:    Ask questions about your health    Make or cancel appointments    Discuss your medicines    Learn about your test results    Speak to your doctor            Additional Information About Your Visit        TransitScreenharJudicata Information     LaFourchette gives you secure access to your electronic health record. If you see a primary care provider, you can also send messages to your care team and make appointments. If you have questions, please call your primary care clinic.  If you do not have a primary care provider, please call [phone] and they will assist you.      LaFourchette is an electronic gateway that provides easy, online access to your medical records. With [employer]chette, you can request a clinic appointment, read your test results, renew a prescription or communicate with your care team.     To access your existing account, please contact your [facility] or call [phone] for assistance.        Care EveryWhere ID     This is your Care EveryWhere ID. This could be used by other organizations to access your [facility] medical records  [id number]         Blood Pressure from Last 3 Encounters:   01/15/18 112/76   12/12/17 111/76    Weight from Last 3 Encounters:   01/13/18 55.5 kg (122 lb 5.7 oz)   12/12/17 55.8 kg (123 lb)   12/12/17 55.8 kg (123 lb 1.6 oz)              We Performed the Following     AUDIOGRAM/TYMPANOGRAM - INTERFACE     [facility] Audiometry Thrshld Eval & Speech Recog (58647)     Tymps / Reflex   ([id number])        Primary Care Provider Fax #    Physician No Ref-Primary [phone]       No address on file        Equal Access to Services     [facility] : Hadii tonia alvarez hadasho Soomaali, waaxda luqadaha, qaybta kaalmada adeegyada, aleisha cesar . So [facility] [phone].    ATENCIÓN: Si habla español, tiene a terry disposición servicios gratuitos de asistencia lingüística. Harris al [phone].    We comply with applicable federal civil rights laws and [place] laws. We do not discriminate on the basis of race, color, national origin, age, disability, sex, sexual orientation, or gender identity.            Thank you!     Thank you for choosing [facility] AUDIOLOGY  for your care. Our goal is always to provide you with excellent care. Hearing back from our patients is one way we can continue to improve our services. Please take a few minutes to complete the written survey that you may receive in the mail after your visit with us. Thank you!             Your Updated Medication List - Protect others around you: Learn how to safely use, store and throw away your medicines at www.disposemymeds.org.          This list is accurate as of 3/20/18  3:02 PM.  Always use your most recent med list.                   Brand Name Dispense Instructions for use Diagnosis    acetaminophen 325 MG tablet    TYLENOL    30 tablet    Take 2 tablets  (650 mg) by mouth every 4 hours as needed for other (multimodal surgical pain management along with NSAIDS and opioid medication as indicated based on pain control and physical function.)    Temporal encephalocele (H)       levETIRAcetam 1000 MG Tabs     8 tablet    Take 1,000 mg by mouth every 12 hours for 4 days    Temporal encephalocele (H)       mineral oil-hydrophilic petrolatum     50 g    Apply topically as needed    Temporal encephalocele (H)       order for DME     1 each    Equipment being ordered: Walker Wheels () and Walker () Treatment Diagnosis: unsteady ambulation s/p crani    Encephalocele (H)       PROZAC 10 MG capsule   Generic drug:  FLUoxetine      Take 5 mg by mouth daily

## 2018-03-20 NOTE — PROGRESS NOTES
AUDIOLOGY REPORT    SUMMARY: Audiology visit completed. See audiogram for results.      RECOMMENDATIONS: Follow-up with ENT.    Gina Hough.  Licensed Audiologist  [place] #[id number]

## 2018-03-20 NOTE — MR AVS SNAPSHOT
After Visit Summary   3/20/2018    Michelle Fisher    MRN: [id number]           Patient Information     Date Of Birth          1985        Visit Information        Provider Department      3/20/2018 4:00 PM Alexia Oglesby MD [facility] Ear Nose and Throat        Today's Diagnoses     Encephalocele (H)    -  1    Conductive hearing loss of right ear with unrestricted hearing of left ear          Care Instructions    1. We will call you after 4/2 when you see local ENT doctor to discuss the plan for your return to clinic to see Dr. Oglesby and discuss additional surgery which could potentially be done in June.   2. Once you have complete CT scan locally, please call and let us know where this was completed so we can obtain scan images.   3. Please call the ENT clinic with any questions,concerns, new or worsening symptoms.    -Clinic number is [phone]   - [facility] direct line (Dr. Oglesby' nurse) [phone]              Follow-ups after your visit        Who to contact     Please call your clinic at [phone] to:    Ask questions about your health    Make or cancel appointments    Discuss your medicines    Learn about your test results    Speak to your doctor            Additional Information About Your Visit        GizmoxharFashfix Information     [employer]ot gives you secure access to your electronic health record. If you see a primary care provider, you can also send messages to your care team and make appointments. If you have questions, please call your primary care clinic.  If you do not have a primary care provider, please call [phone] and they will assist you.      [employer]ot is an electronic gateway that provides easy, online access to your medical records. With [employer]ot, you can request a clinic appointment, read your test results, renew a prescription or communicate with your care team.     To access your existing account, please contact your [facility] or  "call [phone] for assistance.        Care EveryWhere ID     This is your Care EveryWhere ID. This could be used by other organizations to access your [place] medical records  [id number]        Your Vitals Were     Height BMI (Body Mass Index)                1.53 m (5' 0.24\") 24.61 kg/m2           Blood Pressure from Last 3 Encounters:   01/15/18 112/76   12/12/17 111/76    Weight from Last 3 Encounters:   03/20/18 57.6 kg (127 lb)   01/13/18 55.5 kg (122 lb 5.7 oz)   12/12/17 55.8 kg (123 lb)              Today, you had the following     No orders found for display       Primary Care Provider Fax #    Physician No Ref-Primary [phone]       No address on file        Equal Access to Services     ZINA HUYNH : Emil Christianson, waaxda luqadaha, qaybta kaalmada jimyabeto, aleisha cesar . So [facility] [phone].    ATENCIÓN: Si habla español, tiene a terry disposición servicios gratuitos de asistencia lingüística. Llame al [phone].    We comply with applicable federal civil rights laws and [place] laws. We do not discriminate on the basis of race, color, national origin, age, disability, sex, sexual orientation, or gender identity.            Thank you!     Thank you for choosing [facility] EAR NOSE AND THROAT  for your care. Our goal is always to provide you with excellent care. Hearing back from our patients is one way we can continue to improve our services. Please take a few minutes to complete the written survey that you may receive in the mail after your visit with us. Thank you!             Your Updated Medication List - Protect others around you: Learn how to safely use, store and throw away your medicines at www.disposemymeds.org.          This list is accurate as of 3/20/18 11:59 PM.  Always use your most recent med list.                   Brand Name Dispense Instructions for use Diagnosis    acetaminophen 325 MG tablet    TYLENOL    30 tablet    Take 2 " tablets (650 mg) by mouth every 4 hours as needed for other (multimodal surgical pain management along with NSAIDS and opioid medication as indicated based on pain control and physical function.)    Temporal encephalocele (H)       levETIRAcetam 1000 MG Tabs     8 tablet    Take 1,000 mg by mouth every 12 hours for 4 days    Temporal encephalocele (H)       mineral oil-hydrophilic petrolatum     50 g    Apply topically as needed    Temporal encephalocele (H)       order for DME     1 each    Equipment being ordered: Walker Wheels () and Walker () Treatment Diagnosis: unsteady ambulation s/p crani    Encephalocele (H)       PROZAC 10 MG capsule   Generic drug:  FLUoxetine      Take 5 mg by mouth daily

## 2018-03-20 NOTE — PATIENT INSTRUCTIONS
1. We will call you after 4/2 when you see local ENT doctor to discuss the plan for your return to clinic to see Dr. Oglesby and discuss additional surgery which could potentially be done in June.   2. Once you have complete CT scan locally, please call and let us know where this was completed so we can obtain scan images.   3. Please call the ENT clinic with any questions,concerns, new or worsening symptoms.    -Clinic number is [phone]   - Rylie's direct line (Dr. Oglesby' nurse) [phone]

## 2018-03-20 NOTE — LETTER
3/20/2018       RE: Michelle Fisher  [address]  [place] [facility] [address]     Dear Colleague,    Thank you for referring your patient, Michelle Fisher, to the [facility] EAR NOSE AND THROAT at [facility]. Please see a copy of my visit note below.    HISTORY OF PRESENT ILLNESS:  I was asked to see Michelle with this and follow up today for her right middle fossa encephalocele CSF leak.  She is a 32-year-old woman who had surgery on 01/11/2018.  She had an extensive right temporal lobe encephalocele that had worked its way down around the ossicular chain and required a middle fossa approach for repair of the region.   At the time of the surgery, there was so much encephalocele down in the middle ear, that all of it could not be easily removed via the middle fossa approach.  Therefore, we completed the repair with the plan to consider removal via transcanal approach later.      Following the surgery, she has had a number of symptoms that we talked about at the last visit, but learned many of these have been present for many years.  This includes spontaneous onset of dizziness at times, which is worse if she bends over and keeps her head hanging for a long period of time, or if she stands up quickly, but also can occasionally happen if she is just sitting in a chair and not moving or doing anything.  She has uncontrolled migraine headaches currently and her mom specifically inquired about vestibular migraine, given the length of time the symptoms have been occurring and other triggers.  She particularly notes them with weather changes and again stated that they have been happening for a number of years.  Her right ear continues to have some fullness.  We had planned for a CT scan today, but as we are out of network, we are not able to obtain that.       PHYSICAL EXAMINATION:  Michelle appeared well.  She has excellent facial nerve activation bilaterally and the incision has healed very nicely.   The ear canal was lined with healthy skin.  When I visualized the tympanic membrane with the otomicroscope, I was pleased to see that the amount of encephalocele has actually decreased in size as visible through the drum and the whole inferior half of the mesotympanum appears clear.  There is no fluid present.  I can see the pink tissue around the ossicles, both anterior and posterior to the malleus; however, and cannot appreciate the incus as there is additional pink tissue in that region.  It definitely abuts the tympanic membrane.      AUDIOGRAM:  An audiogram demonstrates normal hearing on the left side.  On the right, she has a low and mid frequency conductive loss with 35-40 dB air-bone gaps and the right speech reception threshold is 25 dB and it is 5 on the left and she has excellent word recognition scores and had type A tympanograms bilaterally today.      IMPRESSION AND PLAN:  It is our impression that Michelle Fisher currently has no evidence of CSF leak on the right, but does have likely ongoing necrosing or shrinking tissue from the left over encephalocele in the middle ear that is contributing in part to conductive hearing loss.  Additionally, the suspension of the ossicular chain is altered and may also contribute to some conductive loss on that side, which may not be able to be improved by removal of the tissue.      We discussed that removal of this tissue would require likely a transcanal middle ear exploration. If it could not be removed in this way, we may have to perform additional incisions or approaches that may or may not be advantageous for her.  We would like to wait at least six months from the encephalocele repair to have more confidence that we would not increase risk of CSF leakage after doing the procedure.  We would like her to get a temporal bone CT scan so they are going to work on potentially ordering this through another avenue.  I would be happy to work with her team to accomplish  this.  She is going to come back to see me in a few months as part of the planning process for that next stage.  We reviewed that if the tissue continues to decrease in size, it could improve enough that we do not need to do this with time.         MA/ms         Again, thank you for allowing me to participate in the care of your patient.      Sincerely,    Alexia Oglesby MD

## 2018-03-20 NOTE — LETTER
3/20/2018       RE: Michelle Castillo  [address]  [place] [address]     Dear Colleague,    Thank you for referring your patient, Michelle Castillo, to the [facility] EAR NOSE AND THROAT at [facility]. Please see a copy of my visit note below.    Service Date: 2018      HISTORY OF PRESENT ILLNESS:  Michelle returned to see Dr. 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      She has made good progress but still has intermittent vertigo and dizziness.  On questioning, there is a strong suspicion that this represents vestibular migraine.      Her surgical incision is well-healed.      Dr. Oglesby looked at the tympanic membrane.  Things look good and the residual cerebral tissue in the middle ear seems to have contracted significantly.      ASSESSMENT:  From a neurosurgical point of view, Michelle has done well and there is no evidence that she currently has a CSF leak.      RECOMMENDATIONS:  I have recommended that an interval of 6 months pass before Dr. Oglesby does a surgical procedure to remove the residual cerebral material from the middle ear.         ALVIN CRUZ MD             D: 2018   T: 2018   MT: KEILA      Name:     MICHELLE CASTILLO   MRN:      [id number]        Account:      [id number]   :      1985           Service Date: 2018      Document: [id number]

## 2018-03-20 NOTE — PROGRESS NOTES
Service Date: 2018      HISTORY OF PRESENT ILLNESS:  Michelle returned to see Dr. 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      She has made good progress but still has intermittent vertigo and dizziness.  On questioning, there is a strong suspicion that this represents vestibular migraine.      Her surgical incision is well-healed.      Dr. Oglesby looked at the tympanic membrane.  Things look good and the residual cerebral tissue in the middle ear seems to have contracted significantly.      ASSESSMENT:  From a neurosurgical point of view, Michelle has done well and there is no evidence that she currently has a CSF leak.      RECOMMENDATIONS:  I have recommended that an interval of 6 months pass before Dr. Oglesby does a surgical procedure to remove the residual cerebral material from the middle ear.         ALVIN CRUZ MD             D: 2018   T: 2018   MT: KEILA      Name:     MICHELLE CASTILLO   MRN:      [id number]        Account:      [id number]   :      1985           Service Date: 2018      Document: [id number]

## 2018-03-20 NOTE — MR AVS SNAPSHOT
After Visit Summary   3/20/2018    Michelle Fisher    MRN: [id number]           Patient Information     Date Of Birth          1985        Visit Information        Provider Department      3/20/2018 4:00 PM Ramirez Lama MD [facility] Ear Nose and Throat        Today's Diagnoses     Encephalocele (H)    -  1       Follow-ups after your visit        Who to contact     Please call your clinic at [phone] to:    Ask questions about your health    Make or cancel appointments    Discuss your medicines    Learn about your test results    Speak to your doctor            Additional Information About Your Visit        MyChart Information     Channelkit gives you secure access to your electronic health record. If you see a primary care provider, you can also send messages to your care team and make appointments. If you have questions, please call your primary care clinic.  If you do not have a primary care provider, please call [phone] and they will assist you.      Channelkit is an electronic gateway that provides easy, online access to your medical records. With Channel[employer], you can request a clinic appointment, read your test results, renew a prescription or communicate with your care team.     To access your existing account, please contact your [facility] or call [phone] for assistance.        Care EveryWhere ID     This is your Care EveryWhere ID. This could be used by other organizations to access your [place] medical records  [id number]         Blood Pressure from Last 3 Encounters:   01/15/18 112/76   12/12/17 111/76    Weight from Last 3 Encounters:   03/20/18 57.6 kg (127 lb)   01/13/18 55.5 kg (122 lb 5.7 oz)   12/12/17 55.8 kg (123 lb)              Today, you had the following     No orders found for display       Primary Care Provider Fax #    Physician No Ref-Primary [phone]       No address on file        Equal Access to Services     ZINA HUYNH  AH: Emil saldaña Carlosali, wajaguarda luqadaha, qaybta karenu argeliajaimiealeisha hayneymar jokelsytucker varela. So [facility] [phone].    ATENCIÓN: Si habla margaret, tiene a terry disposición servicios gratuitos de asistencia lingüística. Llame al [phone].    We comply with applicable federal civil rights laws and [place] laws. We do not discriminate on the basis of race, color, national origin, age, disability, sex, sexual orientation, or gender identity.            Thank you!     Thank you for choosing [facility] EAR NOSE AND THROAT  for your care. Our goal is always to provide you with excellent care. Hearing back from our patients is one way we can continue to improve our services. Please take a few minutes to complete the written survey that you may receive in the mail after your visit with us. Thank you!             Your Updated Medication List - Protect others around you: Learn how to safely use, store and throw away your medicines at www.disposemymeds.org.          This list is accurate as of 3/20/18 11:59 PM.  Always use your most recent med list.                   Brand Name Dispense Instructions for use Diagnosis    acetaminophen 325 MG tablet    TYLENOL    30 tablet    Take 2 tablets (650 mg) by mouth every 4 hours as needed for other (multimodal surgical pain management along with NSAIDS and opioid medication as indicated based on pain control and physical function.)    Temporal encephalocele (H)       levETIRAcetam 1000 MG Tabs     8 tablet    Take 1,000 mg by mouth every 12 hours for 4 days    Temporal encephalocele (H)       mineral oil-hydrophilic petrolatum     50 g    Apply topically as needed    Temporal encephalocele (H)       order for DME     1 each    Equipment being ordered: Walker Wheels () and Walker () Treatment Diagnosis: unsteady ambulation s/p crani    Encephalocele (H)       PROZAC 10 MG capsule   Generic drug:  FLUoxetine      Take 5 mg by mouth daily

## 2018-03-20 NOTE — PROGRESS NOTES
HISTORY OF PRESENT ILLNESS:  I was asked to see Michelle with this and follow up today for her right middle fossa encephalocele CSF leak.  She is a 32-year-old woman who had surgery on 01/11/2018.  She had an extensive right temporal lobe encephalocele that had worked its way down around the ossicular chain and required a middle fossa approach for repair of the region.   At the time of the surgery, there was so much encephalocele down in the middle ear, that all of it could not be easily removed via the middle fossa approach.  Therefore, we completed the repair with the plan to consider removal via transcanal approach later.      Following the surgery, she has had a number of symptoms that we talked about at the last visit, but learned many of these have been present for many years.  This includes spontaneous onset of dizziness at times, which is worse if she bends over and keeps her head hanging for a long period of time, or if she stands up quickly, but also can occasionally happen if she is just sitting in a chair and not moving or doing anything.  She has uncontrolled migraine headaches currently and her mom specifically inquired about vestibular migraine, given the length of time the symptoms have been occurring and other triggers.  She particularly notes them with weather changes and again stated that they have been happening for a number of years.  Her right ear continues to have some fullness.  We had planned for a CT scan today, but as we are out of network, we are not able to obtain that.       PHYSICAL EXAMINATION:  Michelle appeared well.  She has excellent facial nerve activation bilaterally and the incision has healed very nicely.  The ear canal was lined with healthy skin.  When I visualized the tympanic membrane with the otomicroscope, I was pleased to see that the amount of encephalocele has actually decreased in size as visible through the drum and the whole inferior half of the mesotympanum appears  clear.  There is no fluid present.  I can see the pink tissue around the ossicles, both anterior and posterior to the malleus; however, and cannot appreciate the incus as there is additional pink tissue in that region.  It definitely abuts the tympanic membrane.      AUDIOGRAM:  An audiogram demonstrates normal hearing on the left side.  On the right, she has a low and mid frequency conductive loss with 35-40 dB air-bone gaps and the right speech reception threshold is 25 dB and it is 5 on the left and she has excellent word recognition scores and had type A tympanograms bilaterally today.      IMPRESSION AND PLAN:  It is our impression that Mihcelle Fisher currently has no evidence of CSF leak on the right, but does have likely ongoing necrosing or shrinking tissue from the left over encephalocele in the middle ear that is contributing in part to conductive hearing loss.  Additionally, the suspension of the ossicular chain is altered and may also contribute to some conductive loss on that side, which may not be able to be improved by removal of the tissue.      We discussed that removal of this tissue would require likely a transcanal middle ear exploration. If it could not be removed in this way, we may have to perform additional incisions or approaches that may or may not be advantageous for her.  We would like to wait at least six months from the encephalocele repair to have more confidence that we would not increase risk of CSF leakage after doing the procedure.  We would like her to get a temporal bone CT scan so they are going to work on potentially ordering this through another avenue.  I would be happy to work with her team to accomplish this.  She is going to come back to see me in a few months as part of the planning process for that next stage.  We reviewed that if the tissue continues to decrease in size, it could improve enough that we do not need to do this with time.         MA/ms

## 2018-04-02 ENCOUNTER — TRANSFERRED RECORDS (OUTPATIENT)
Dept: HEALTH INFORMATION MANAGEMENT | Facility: CLINIC | Age: 33
End: 2018-04-02

## 2018-04-12 ENCOUNTER — CARE COORDINATION (OUTPATIENT)
Dept: OTOLARYNGOLOGY | Facility: CLINIC | Age: 33
End: 2018-04-12

## 2018-04-12 NOTE — PROGRESS NOTES
Called and left message for patient's mom to check in to see if patient is planning to complete CT temporal bones with local provider or if we can help arrange locally so insurance will cover scan. Patient also needs to arrange follow-up with Dr. Oglesby, PAC appointment and potential surgery date for June. Left direct line and encouraged mom to return call to discuss.     Rylie Barker, RN, BSN

## 2018-05-08 ENCOUNTER — TELEPHONE (OUTPATIENT)
Dept: OTOLARYNGOLOGY | Facility: CLINIC | Age: 33
End: 2018-05-08

## 2018-05-08 NOTE — TELEPHONE ENCOUNTER
Left patient's mother voicemail regarding scheduling appointment with Dr Oglesby in June. Gave patient my direct phone number to contact.      Iris  [facility] Coordinator

## 2018-05-11 NOTE — TELEPHONE ENCOUNTER
[facility] Call Center    Phone Message    May a detailed message be left on voicemail: yes    Reason for Call: Other: Pt calling back to schedule Dr. Oglesby appt with Iris.  Pt NOT availabl 6/8/18 - 6/24/18. Please have Iris follow up with pt - on cell phone.     Action Taken: Other: [facility]

## 2018-07-09 ENCOUNTER — TELEPHONE (OUTPATIENT)
Dept: AUDIOLOGY | Facility: CLINIC | Age: 33
End: 2018-07-09

## 2018-07-10 ENCOUNTER — OFFICE VISIT (OUTPATIENT)
Dept: AUDIOLOGY | Facility: CLINIC | Age: 33
End: 2018-07-10
Payer: COMMERCIAL

## 2018-07-10 ENCOUNTER — OFFICE VISIT (OUTPATIENT)
Dept: OTOLARYNGOLOGY | Facility: CLINIC | Age: 33
End: 2018-07-10
Payer: COMMERCIAL

## 2018-07-10 DIAGNOSIS — Q01.8 TEMPORAL ENCEPHALOCELE (H): Primary | ICD-10-CM

## 2018-07-10 DIAGNOSIS — H93.8X1 SENSATION OF FULLNESS IN RIGHT EAR: ICD-10-CM

## 2018-07-10 DIAGNOSIS — H90.11 CONDUCTIVE HEARING LOSS OF RIGHT EAR WITH UNRESTRICTED HEARING OF LEFT EAR: Primary | ICD-10-CM

## 2018-07-10 ASSESSMENT — PAIN SCALES - GENERAL: PAINLEVEL: NO PAIN (0)

## 2018-07-10 NOTE — MR AVS SNAPSHOT
After Visit Summary   7/10/2018    Michelle Fisher    MRN: [id number]           Patient Information     Date Of Birth          1985        Visit Information        Provider Department      7/10/2018 1:30 PM Mindy Stock AuD [facility] Audiology        Today's Diagnoses     Conductive hearing loss of right ear with unrestricted hearing of left ear    -  1    Sensation of fullness in right ear           Follow-ups after your visit        Who to contact     Please call your clinic at [phone] to:    Ask questions about your health    Make or cancel appointments    Discuss your medicines    Learn about your test results    Speak to your doctor            Additional Information About Your Visit        [email or web address] Information     [employer] gives you secure access to your electronic health record. If you see a primary care provider, you can also send messages to your care team and make appointments. If you have questions, please call your primary care clinic.  If you do not have a primary care provider, please call [phone] and they will assist you.      [employer] is an electronic gateway that provides easy, online access to your medical records. With [employer], you can request a clinic appointment, read your test results, renew a prescription or communicate with your care team.     To access your existing account, please contact your [facility] or call [phone] for assistance.        Care EveryWhere ID     This is your Care EveryWhere ID. This could be used by other organizations to access your [facility] medical records  [id number]         Blood Pressure from Last 3 Encounters:   01/15/18 112/76   12/12/17 111/76    Weight from Last 3 Encounters:   03/20/18 57.6 kg (127 lb)   01/13/18 55.5 kg (122 lb 5.7 oz)   12/12/17 55.8 kg (123 lb)              We Performed the Following     AUDIOGRAM/TYMPANOGRAM - INTERFACE     [facility] Audi[facility] Thr[facility]ld Eval & Speech Recog  ([address]011)     Tymps / Reflex   ([id number])        Primary Care Provider Fax #    Physician No Ref-Primary [phone]       No address on file        Equal Access to Services     ZINA HUYNH : Emil aad ku hadana maria Christianson, elma chanelfiliberto, tyrone karenu huerta, aleisha blumbeulah avery. So [facility] [phone].    ATENCIÓN: Si habla español, tiene a terry disposición servicios gratuitos de asistencia lingüística. Llame al [phone].    We comply with applicable federal civil rights laws and [place] laws. We do not discriminate on the basis of race, color, national origin, age, disability, sex, sexual orientation, or gender identity.            Thank you!     Thank you for choosing [facility] AUDIOLOGY  for your care. Our goal is always to provide you with excellent care. Hearing back from our patients is one way we can continue to improve our services. Please take a few minutes to complete the written survey that you may receive in the mail after your visit with us. Thank you!             Your Updated Medication List - Protect others around you: Learn how to safely use, store and throw away your medicines at www.disposemymeds.org.          This list is accurate as of 7/10/18  3:28 PM.  Always use your most recent med list.                   Brand Name Dispense Instructions for use Diagnosis    acetaminophen 325 MG tablet    TYLENOL    30 tablet    Take 2 tablets (650 mg) by mouth every 4 hours as needed for other (multimodal surgical pain management along with NSAIDS and opioid medication as indicated based on pain control and physical function.)    Temporal encephalocele (H)       levETIRAcetam 1000 MG Tabs     8 tablet    Take 1,000 mg by mouth every 12 hours for 4 days    Temporal encephalocele (H)       mineral oil-hydrophilic petrolatum     50 g    Apply topically as needed    Temporal encephalocele (H)       order for DME     1 each    Equipment being ordered: Walker Wheels () and Walker  () Treatment Diagnosis: unsteady ambulation s/p crani    Encephalocele (H)       PROZAC 10 MG capsule   Generic drug:  FLUoxetine      Take 5 mg by mouth daily

## 2018-07-10 NOTE — LETTER
7/10/2018       RE: Michelle Fisher  [address]  [place] [facility] [address]     Dear Colleague,    Thank you for referring your patient, Michelle Fisher, to the [facility] EAR NOSE AND THROAT at [facility]. Please see a copy of my visit note below.    Michelle Fisher is a 32 year old seen in [facility] today for follow-up of encephalocele.     History of Present Illness:  The patient had extensive right temporal lobe encephalocele that worked down toward the ossicular chain. She underwent surgery to remove the encephalocele via middle fossa approach but not all of the tissue could be removed. The surgery was completed with plans to monitor the area and consider removal via transcanal approach at a later time. The patient was seen in follow-up on 3/20/18 where otologic exam revealed improved encephalocele with no evidence of CSF leak.      In the past month, the patient developed worsening otalgia and a sensation of fullness in the right ear. She feels as though there is fluid behind the TM, no otorrhea. This comes and goes, some days have lots of symptoms and others are symptom free. The frequency of symptoms decreased when she began wearing ear plugs into the shower. She has some chronic headaches and dizziness which are unchanged. No double vision, facial paralysis, numbness, trouble swallowing, fever, chills, nausea or other complaints.     PAST MEDICAL HISTORY:   Past Medical History:   Diagnosis Date     Conductive hearing loss 01/01/2017     Depressive disorder 01/01/2017     Encephalocele (H)      Migraines 01/01/2015     Recurrent otitis media 01/01/2016       PAST SURGICAL HISTORY:   Past Surgical History:   Procedure Laterality Date     CRANIOTOMY MIDDLE FOSSA, EXCISE ACOUSTIC NEUROMA, COMBINED Right 1/11/2018    Procedure: COMBINED CRANIOTOMY MIDDLE FOSSA, EXCISE ACOUSTIC NEUROMA;;  Surgeon: Ramirez Lama MD;  Location: [facility] OR     INSERT DRAIN LUMBAR N/A  1/11/2018    Procedure: INSERT DRAIN LUMBAR;  Lumbar Drain Placement, Right Middle Cranial Fossa Encephalocele Repair  latex safe ;  Surgeon: Ramirez Lama MD;  Location: [facility] OR     MEDICATIONS:   Current Outpatient Prescriptions   Medication     acetaminophen (TYLENOL) 325 MG tablet     FLUoxetine (PROZAC) 10 MG capsule     levETIRAcetam 1000 MG TABS     mineral oil-hydrophilic petrolatum (AQUAPHOR)     order for DME     No current facility-administered medications for this visit.        ALLERGIES:  No Known Allergies    FAMILY HISTORY:   Family History   Problem Relation Age of Onset     Cancer Maternal Grandmother      Hypertension Maternal Grandmother      Thyroid Disease Maternal Grandmother      Stomach Problem Maternal Grandmother      ulcers     HEART DISEASE Maternal Grandfather      Hypertension Maternal Grandfather      Substance Abuse Maternal Grandfather      Hypertension Mother      and high cholesterol     Depression Mother      Migraines Mother      Cerebrovascular Disease Paternal Grandfather      Cerebrovascular Disease Paternal Grandmother      Mental Illness Paternal Grandmother      Other - See Comments Father      High cholesterol        SOCIAL HISTORY:   Social History     Social History     Marital status:      Spouse name: N/A     Number of children: N/A     Years of education: N/A     Occupational History     Not on file.     Social History Main Topics     Smoking status: Never Smoker     Smokeless tobacco: Never Used     Alcohol use Yes      Comment: occasional - holidays and birthday.      Drug use: No     Sexual activity: Not Currently     Partners: Male     Birth control/ protection: None     Other Topics Concern     Not on file     Social History Narrative    .  Lives with .    Works at [employer].    No children.  G0      PHYSICAL EXAMINATION:   Constitutional:  Pleasant well-appearing, in no apparent distress  Eyes:   Extraocular movements are intact,  no nystagmus   Ears:  Left ear examined under the microscope.    Left: external ear and pinna are within normal limits; external auditory canal is clear and of large caliber. The tympanic membrane is visualized and there does not appear to be a large amount of encephalocele visible. There is a slight amount of pink tissue surrounding the malleus but this is stable from prior examination. However, there is a pulsatile movement to the TM. No obvious effusion is identified.   Respiratory:  Easy work of breathing. No stertor or stridor.   Musculoskeletal:  Normal upper body strength  Skin:  No lesions on the head and neck.   Neurologic:  Cranial nerves II through VII and IX through XII are intact, specifically facial nerve function is normal and symmetric.    Procedure: The risks and benefits of a tympanocentesis was described to the patient and her mother and they agreed to proceed. The right ear was examined under the microscope.  Phenol was placed on the anterior quadrant. A 27-gauge needle was placed into the anesthestized area and the syringe plunger was slowly withdrawn.  After aspirating, no fluid was noted in the syringe, however the pulsations of the TM were noted to be less prominent than before. The patient tolerated the procedure well with no complications.    AUDIOGRAM:    Left ear normal hearing with air-bone gaps 250-500 Hz. Right ear moderate CHL rising to normal hearing. These findings are consistent with audiogram from 3/20/18.   Tymps: type A estefani  Reflexes: Left ipsi present at normal level, all other are abs.     ASSESSMENT AND PLAN:   Michelle Fisher is a 32 year old with a history of encephalocele with CSF leak through middle fossa which was repaired surgically on 1/11/2018. A portion of the encephalocele could not be extracted fully from above due to insinuation around the ossicles and inferior extent. The procedure was completed with the plan to consider further removal via transcanal approach at  a later date.     We feel that the patient has residual tissue in the middle ear space but this has not significantly changed. It is unclear if that tissue has established new blood supply or is necrosing. A right tympanocentesis was performed in-clinic today to evaluate for CSF leak after observing pulsations of the TM, however, no fluid was identified. Her audiogram is also stable. After discussing the risks and benefits of a repeat surgery to remove tissue, we elected to order a CT to further evaluate the middle ear. Depending on those findings, a transcanal procedure may be indicated. All questions were answered.       I, Popeye Sanders, am acting as a scribe to document the services performed by Dr. Alexia Oglesby MD. All information contained within the note was discussed with the provider and reviewed prior to being entered into the medical record.     The documentation recorded by the scribe accurately reflects the services I personally performed and the decisions made by me.    Alexia Oglesby MD  Otology & Neurotology  [facility]

## 2018-07-10 NOTE — MR AVS SNAPSHOT
After Visit Summary   7/10/2018    Michelle Fisher    MRN: [id number]           Patient Information     Date Of Birth          1985        Visit Information        Provider Department      7/10/2018 2:30 PM Ramirez Lama MD [facility] Ear Nose and Throat        Today's Diagnoses     Temporal encephalocele (H)    -  1       Follow-ups after your visit        Who to contact     Please call your clinic at [phone] to:    Ask questions about your health    Make or cancel appointments    Discuss your medicines    Learn about your test results    Speak to your doctor            Additional Information About Your Visit        MyChart Information     [employer] gives you secure access to your electronic health record. If you see a primary care provider, you can also send messages to your care team and make appointments. If you have questions, please call your primary care clinic.  If you do not have a primary care provider, please call [phone] and they will assist you.      [employer] is an electronic gateway that provides easy, online access to your medical records. With [employer], you can request a clinic appointment, read your test results, renew a prescription or communicate with your care team.     To access your existing account, please contact your [facility] or call [phone] for assistance.        Care EveryWhere ID     This is your Care EveryWhere ID. This could be used by other organizations to access your [facility] medical records  [id number]         Blood Pressure from Last 3 Encounters:   01/15/18 112/76   12/12/17 111/76    Weight from Last 3 Encounters:   03/20/18 57.6 kg (127 lb)   01/13/18 55.5 kg (122 lb 5.7 oz)   12/12/17 55.8 kg (123 lb)              Today, you had the following     No orders found for display       Primary Care Provider Fax #    Physician No Ref-Primary [phone]       No address on file        Equal Access to Services      ZINA [facility]portMORGAN : Hadii aad ku piper Christianson, waaxda luqadaha, qaybta kaalmada argeliahabeto, aleisha jokelsytucker cesar . So [facility] [phone].    ATENCIÓN: Si habla espthomas, tiene a terry disposición servicios gratuitos de asistencia lingüística. Llame al [phone].    We comply with applicable federal civil rights laws and [place] laws. We do not discriminate on the basis of race, color, national origin, age, disability, sex, sexual orientation, or gender identity.            Thank you!     Thank you for choosing [facility] EAR NOSE AND THROAT  for your care. Our goal is always to provide you with excellent care. Hearing back from our patients is one way we can continue to improve our services. Please take a few minutes to complete the written survey that you may receive in the mail after your visit with us. Thank you!             Your Updated Medication List - Protect others around you: Learn how to safely use, store and throw away your medicines at www.disposemymeds.org.          This list is accurate as of 7/10/18 11:59 PM.  Always use your most recent med list.                   Brand Name Dispense Instructions for use Diagnosis    acetaminophen 325 MG tablet    TYLENOL    30 tablet    Take 2 tablets (650 mg) by mouth every 4 hours as needed for other (multimodal surgical pain management along with NSAIDS and opioid medication as indicated based on pain control and physical function.)    Temporal encephalocele (H)       levETIRAcetam 1000 MG Tabs     8 tablet    Take 1,000 mg by mouth every 12 hours for 4 days    Temporal encephalocele (H)       mineral oil-hydrophilic petrolatum     50 g    Apply topically as needed    Temporal encephalocele (H)       order for DME     1 each    Equipment being ordered: Walker Wheels () and Walker () Treatment Diagnosis: unsteady ambulation s/p crani    Encephalocele (H)       PROZAC 10 MG capsule   Generic drug:  FLUoxetine      Take 5 mg by mouth daily

## 2018-07-10 NOTE — LETTER
7/10/2018       RE: Michelle Castillo  [address]  [place] [address]     Dear Colleague,    Thank you for referring your patient, Michelle Castillo, to the [facility] EAR NOSE AND THROAT at [facility]. Please see a copy of my visit note below.    Service Date: 07/10/2018      HISTORY OF PRESENT ILLNESS:  Ms. Castillo is seen today for further followup after a middle fossa repair of the temporal encephalocele.      She really has had no significant postoperative neurosurgical issues.  She denies any evidence of CSF leakage.      She has had some auditory symptoms, and today, Dr. Oglesby examined her under the microscope.  She has pulsatile eardrum, and Dr. Oglesby did a tympanocentesis which did not reveal any spinal fluid.      ASSESSMENT AND PLAN:  For now, the neurosurgical issues seem to be resolved, and Dr. Oglesby will be dealing with her continuing hearing issues.         RAMIREZ LAMA MD             D: 07/10/2018   T: 07/10/2018   MT: RONA      Name:     MICHELLE CASTILLO   MRN:      [id number]        Account:      [id number]   :      1985           Service Date: 07/10/2018      Document: [id number]       Again, thank you for allowing me to participate in the care of your patient.      Sincerely,    Ramirez Lama MD

## 2018-07-10 NOTE — PROGRESS NOTES
AUDIOLOGY REPORT    SUMMARY: Audiology visit completed. See audiogram for results.      RECOMMENDATIONS: Follow-up with ENT.      Yajaira Lopes  Audiologist  [place] License  #[id number]

## 2018-07-10 NOTE — PATIENT INSTRUCTIONS
1. Please complete CT scan locally. Once you have this completed, please call Rylie to ensure that we can obtain images and results for Dr. Oglesby to review.   2. Please call the ENT clinic with any questions,concerns, new or worsening symptoms.    -Clinic number is [phone]   - Rylie's direct line (Dr. Oglesby' nurse) [phone]

## 2018-07-10 NOTE — PROGRESS NOTES
Michelle Fisher is a 32 year old seen in [facility] today for follow-up of encephalocele.     History of Present Illness:  The patient had extensive right temporal lobe encephalocele that worked down toward the ossicular chain. She underwent surgery to remove the encephalocele via middle fossa approach but not all of the tissue could be removed. The surgery was completed with plans to monitor the area and consider removal via transcanal approach at a later time. The patient was seen in follow-up on 3/20/18 where otologic exam revealed improved encephalocele with no evidence of CSF leak.      In the past month, the patient developed worsening otalgia and a sensation of fullness in the right ear. She feels as though there is fluid behind the TM, no otorrhea. This comes and goes, some days have lots of symptoms and others are symptom free. The frequency of symptoms decreased when she began wearing ear plugs into the shower. She has some chronic headaches and dizziness which are unchanged. No double vision, facial paralysis, numbness, trouble swallowing, fever, chills, nausea or other complaints.     PAST MEDICAL HISTORY:   Past Medical History:   Diagnosis Date     Conductive hearing loss 01/01/2017     Depressive disorder 01/01/2017     Encephalocele (H)      Migraines 01/01/2015     Recurrent otitis media 01/01/2016       PAST SURGICAL HISTORY:   Past Surgical History:   Procedure Laterality Date     CRANIOTOMY MIDDLE FOSSA, EXCISE ACOUSTIC NEUROMA, COMBINED Right 1/11/2018    Procedure: COMBINED CRANIOTOMY MIDDLE FOSSA, EXCISE ACOUSTIC NEUROMA;;  Surgeon: Ramirez Lama MD;  Location: [facility] OR     INSERT DRAIN LUMBAR N/A 1/11/2018    Procedure: INSERT DRAIN LUMBAR;  Lumbar Drain Placement, Right Middle Cranial Fossa Encephalocele Repair  latex safe ;  Surgeon: Ramirez Lama MD;  Location: [facility] OR     MEDICATIONS:   Current Outpatient Prescriptions   Medication     acetaminophen (TYLENOL)  325 MG tablet     FLUoxetine (PROZAC) 10 MG capsule     levETIRAcetam 1000 MG TABS     mineral oil-hydrophilic petrolatum (AQUAPHOR)     order for DME     No current facility-administered medications for this visit.        ALLERGIES:  No Known Allergies    FAMILY HISTORY:   Family History   Problem Relation Age of Onset     Cancer Maternal Grandmother      Hypertension Maternal Grandmother      Thyroid Disease Maternal Grandmother      Stomach Problem Maternal Grandmother      ulcers     HEART DISEASE Maternal Grandfather      Hypertension Maternal Grandfather      Substance Abuse Maternal Grandfather      Hypertension Mother      and high cholesterol     Depression Mother      Migraines Mother      Cerebrovascular Disease Paternal Grandfather      Cerebrovascular Disease Paternal Grandmother      Mental Illness Paternal Grandmother      Other - See Comments Father      High cholesterol        SOCIAL HISTORY:   Social History     Social History     Marital status:      Spouse name: N/A     Number of children: N/A     Years of education: N/A     Occupational History     Not on file.     Social History Main Topics     Smoking status: Never Smoker     Smokeless tobacco: Never Used     Alcohol use Yes      Comment: occasional - holidays and birthday.      Drug use: No     Sexual activity: Not Currently     Partners: Male     Birth control/ protection: None     Other Topics Concern     Not on file     Social History Narrative    .  Lives with .    Works at [employer].    No children.  G0      PHYSICAL EXAMINATION:   Constitutional:  Pleasant well-appearing, in no apparent distress  Eyes:   Extraocular movements are intact, no nystagmus   Ears:  Left ear examined under the microscope.    Left: external ear and pinna are within normal limits; external auditory canal is clear and of large caliber. The tympanic membrane is visualized and there does not appear to be a large amount of encephalocele  visible. There is a slight amount of pink tissue surrounding the malleus but this is stable from prior examination. However, there is a pulsatile movement to the TM. No obvious effusion is identified.   Respiratory:  Easy work of breathing. No stertor or stridor.   Musculoskeletal:  Normal upper body strength  Skin:  No lesions on the head and neck.   Neurologic:  Cranial nerves II through VII and IX through XII are intact, specifically facial nerve function is normal and symmetric.    Procedure: The risks and benefits of a tympanocentesis was described to the patient and her mother and they agreed to proceed. The right ear was examined under the microscope.  Phenol was placed on the anterior quadrant. A 27-gauge needle was placed into the anesthestized area and the syringe plunger was slowly withdrawn.  After aspirating, no fluid was noted in the syringe, however the pulsations of the TM were noted to be less prominent than before. The patient tolerated the procedure well with no complications.    AUDIOGRAM:    Left ear normal hearing with air-bone gaps 250-500 Hz. Right ear moderate CHL rising to normal hearing. These findings are consistent with audiogram from 3/20/18.   Tymps: type A estefani  Reflexes: Left ipsi present at normal level, all other are abs.     ASSESSMENT AND PLAN:   Michelle Fisher is a 32 year old with a history of encephalocele with CSF leak through middle fossa which was repaired surgically on 1/11/2018. A portion of the encephalocele could not be extracted fully from above due to insinuation around the ossicles and inferior extent. The procedure was completed with the plan to consider further removal via transcanal approach at a later date.     We feel that the patient has residual tissue in the middle ear space but this has not significantly changed. It is unclear if that tissue has established new blood supply or is necrosing. A right tympanocentesis was performed in-clinic today to evaluate for  CSF leak after observing pulsations of the TM, however, no fluid was identified. Her audiogram is also stable. After discussing the risks and benefits of a repeat surgery to remove tissue, we elected to order a CT to further evaluate the middle ear. Depending on those findings, a transcanal procedure may be indicated. All questions were answered.       I, Popeye Sanders, am acting as a scribe to document the services performed by Dr. Alexia Oglesby MD. All information contained within the note was discussed with the provider and reviewed prior to being entered into the medical record.     The documentation recorded by the scribe accurately reflects the services I personally performed and the decisions made by me.    Alexia Oglesby MD  Otology & Neurotology  [facility]

## 2018-07-10 NOTE — MR AVS SNAPSHOT
After Visit Summary   7/10/2018    Michelle Fisher    MRN: [id number]           Patient Information     Date Of Birth          1985        Visit Information        Provider Department      7/10/2018 2:30 PM Alexia Oglesby MD [facility] Ear Nose and Throat        Today's Diagnoses     Temporal encephalocele (H)    -  1      Care Instructions    1. Please complete CT scan locally. Once you have this completed, please call [facility] to ensure that we can obtain images and results for Dr. Oglesby to review.   2. Please call the ENT clinic with any questions,concerns, new or worsening symptoms.    -Clinic number is [phone]   - [facility]ylie's direct line (Dr. Oglesby' nurse) [phone]             Follow-ups after your visit        Who to contact     Please call your clinic at [phone] to:    Ask questions about your health    Make or cancel appointments    Discuss your medicines    Learn about your test results    Speak to your doctor            Additional Information About Your Visit        [employer] Information     [employer] gives you secure access to your electronic health record. If you see a primary care provider, you can also send messages to your care team and make appointments. If you have questions, please call your primary care clinic.  If you do not have a primary care provider, please call [phone] and they will assist you.      [employer] is an electronic gateway that provides easy, online access to your medical records. With [employer], you can request a clinic appointment, read your test results, renew a prescription or communicate with your care team.     To access your existing account, please contact your [facility] or call [phone] for assistance.        Care EveryWhere ID     This is your Care EveryWhere ID. This could be used by other organizations to access your [facility] medical records  [id number]         Blood Pressure from Last 3 Encounters:    01/15/18 112/76   12/12/17 111/76    Weight from Last 3 Encounters:   03/20/18 57.6 kg (127 lb)   01/13/18 55.5 kg (122 lb 5.7 oz)   12/12/17 55.8 kg (123 lb)               Primary Care Provider Fax #    Physician No Ref-Primary [phone]       No address on file        Equal Access to Services     ZINA HUYNH : Hadii aad ku hadasho Soomaali, waaxda luqadaha, qaybta kaalmada adeegyada, waxkaty hassanin pitan argeliacharlie bentley laRadhaneymar . So [facility] [phone].    ATENCIÓN: Si habla español, tiene a terry disposición servicios gratuitos de asistencia lingüística. Llame al [phone].    We comply with applicable federal civil rights laws and [place] laws. We do not discriminate on the basis of race, color, national origin, age, disability, sex, sexual orientation, or gender identity.            Thank you!     Thank you for choosing [facility] EAR NOSE AND THROAT  for your care. Our goal is always to provide you with excellent care. Hearing back from our patients is one way we can continue to improve our services. Please take a few minutes to complete the written survey that you may receive in the mail after your visit with us. Thank you!             Your Updated Medication List - Protect others around you: Learn how to safely use, store and throw away your medicines at www.disposemymeds.org.          This list is accurate as of 7/10/18  3:33 PM.  Always use your most recent med list.                   Brand Name Dispense Instructions for use Diagnosis    acetaminophen 325 MG tablet    TYLENOL    30 tablet    Take 2 tablets (650 mg) by mouth every 4 hours as needed for other (multimodal surgical pain management along with NSAIDS and opioid medication as indicated based on pain control and physical function.)    Temporal encephalocele (H)       levETIRAcetam 1000 MG Tabs     8 tablet    Take 1,000 mg by mouth every 12 hours for 4 days    Temporal encephalocele (H)       mineral oil-hydrophilic petrolatum     50 g    Apply  topically as needed    Temporal encephalocele (H)       order for DME     1 each    Equipment being ordered: Walker Wheels () and Walker () Treatment Diagnosis: unsteady ambulation s/p crani    Encephalocele (H)       PROZAC 10 MG capsule   Generic drug:  FLUoxetine      Take 5 mg by mouth daily

## 2018-07-11 NOTE — PROGRESS NOTES
Service Date: 07/10/2018      HISTORY OF PRESENT ILLNESS:  Ms. Castillo is seen today for further followup after a middle fossa repair of the temporal encephalocele.      She really has had no significant postoperative neurosurgical issues.  She denies any evidence of CSF leakage.      She has had some auditory symptoms, and today, Dr. Oglesby examined her under the microscope.  She has pulsatile eardrum, and Dr. Oglesby did a tympanocentesis which did not reveal any spinal fluid.      ASSESSMENT AND PLAN:  For now, the neurosurgical issues seem to be resolved, and Dr. Oglesby will be dealing with her continuing hearing issues.         ALVIN CRUZ MD             D: 07/10/2018   T: 07/10/2018   MT: RONA      Name:     EUN CASTILLO   MRN:      [id number]        Account:      [id number]   :      1985           Service Date: 07/10/2018      Document: [id number]

## 2018-07-23 ENCOUNTER — TRANSFERRED RECORDS (OUTPATIENT)
Dept: HEALTH INFORMATION MANAGEMENT | Facility: CLINIC | Age: 33
End: 2018-07-23

## 2018-08-09 ENCOUNTER — CARE COORDINATION (OUTPATIENT)
Dept: OTOLARYNGOLOGY | Facility: CLINIC | Age: 33
End: 2018-08-09

## 2018-08-09 NOTE — PROGRESS NOTES
Dr. Oglesby has reviewed the scan done 7-23-18: no fluid under the ear drum - healed up well, area where soft tissue was is improving - strongly recommend  Holding off on surgery.   Rtc in 6 months with audio, no scans will generate reminder letter.  Above message left on pt voice mail, call for any questions.  Yesi Mena RN  ENT Care Coordinator   Otology  [phone]  8/9/2018 2:47 PM

## 2019-02-15 DIAGNOSIS — H91.90 HEARING LOSS: Primary | ICD-10-CM

## 2019-02-19 ENCOUNTER — ANCILLARY PROCEDURE (OUTPATIENT)
Dept: CT IMAGING | Facility: CLINIC | Age: 34
End: 2019-02-19
Attending: OTOLARYNGOLOGY
Payer: COMMERCIAL

## 2019-02-19 ENCOUNTER — OFFICE VISIT (OUTPATIENT)
Dept: AUDIOLOGY | Facility: CLINIC | Age: 34
End: 2019-02-19
Payer: COMMERCIAL

## 2019-02-19 ENCOUNTER — OFFICE VISIT (OUTPATIENT)
Dept: OTOLARYNGOLOGY | Facility: CLINIC | Age: 34
End: 2019-02-19
Payer: COMMERCIAL

## 2019-02-19 VITALS
HEART RATE: 81 BPM | DIASTOLIC BLOOD PRESSURE: 89 MMHG | SYSTOLIC BLOOD PRESSURE: 146 MMHG | BODY MASS INDEX: 24.54 KG/M2 | HEIGHT: 60 IN | WEIGHT: 125 LBS

## 2019-02-19 DIAGNOSIS — Q01.8 TEMPORAL ENCEPHALOCELE (H): ICD-10-CM

## 2019-02-19 DIAGNOSIS — H91.90 HEARING LOSS: ICD-10-CM

## 2019-02-19 DIAGNOSIS — H90.11 CONDUCTIVE HEARING LOSS OF RIGHT EAR WITH UNRESTRICTED HEARING OF LEFT EAR: Primary | ICD-10-CM

## 2019-02-19 DIAGNOSIS — H90.11 CONDUCTIVE HEARING LOSS IN RIGHT EAR: Primary | ICD-10-CM

## 2019-02-19 ASSESSMENT — MIFFLIN-ST. JEOR: SCORE: 1191.01

## 2019-02-19 ASSESSMENT — PAIN SCALES - GENERAL: PAINLEVEL: SEVERE PAIN (7)

## 2019-02-19 NOTE — PROGRESS NOTES
AUDIOLOGY REPORT    SUMMARY: Audiology visit completed. See audiogram for results.      RECOMMENDATIONS: Follow-up with ENT.    Gina Dacosta, [facility]  Licensed Audiologist  [place] License #[id number]

## 2019-02-19 NOTE — PATIENT INSTRUCTIONS
You will need  to schedule a CT scan, we will contact you with results.        Please call our clinic for any questions,concerns,or worsening symptoms.      Clinic #[phone]       Option 3  for the ENT Care Team.       Option 1 for scheduling.    JESICA Rodriguez  [phone]

## 2019-02-19 NOTE — LETTER
2/19/2019       RE: Michelle Fisher  [address]  [place] [address]     Dear Colleague,    Thank you for referring your patient, Michelle Fisher, to the [facility] EAR NOSE AND THROAT at [facility]. Please see a copy of my visit note below.    Michelle Fisher is a 32 year old seen in [facility] today for follow-up of her surgically repaired right temporal lobe encephalocele.     History of Present Illness:  The patient had extensive right temporal lobe encephalocele that was insinuated between the members of her ossicular chain and that filled much of the middle ear. She underwent surgery to remove the encephalocele via middle fossa approach but not all of the tissue could be removed without ossicular disruption. The surgery was completed with plans to monitor the area and consider removal via transcanal approach at a later time. The patient was seen in follow-up on 3/20/18 where otologic exam revealed improved encephalocele with no evidence of CSF leak.  We last saw her last summer and the tissue was found to have atrophied further.  Interval imaging confirmed this.  We made a plan for ongoing observation.  She reports today that she feels pressure in her right ear when she bends  down and stands back up again.  Therefore, she is moving slowly when she is bending in order not to feel the pressure as much.  She does not feel like she hears well out of the right ear but also does not feel like this is any different from the time of her surgery.  She reports that she is continuing to have dizziness.  We talked about this in more detail today and confirmed that this always starts with a headache that goes across her head and is associated with light sensitivity and nausea and sometimes vomiting.  She will then get a spinning sensation.  Her mom has migraines that were prevalent in her young adulthood and that stopped by age 45.  The patient was having this dizziness  with the headaches prior to her encephalocele diagnosis and surgery and she recalled this today during the appointment.  She finds that sometimes when she eats her headaches are worse.  She is taking Prozac but does not use a migraine preventive strategy.  She has some chronic headaches and dizziness which are unchanged and that are secondary to migraine. No double vision, facial paralysis, numbness, trouble swallowing, fever, chills, nausea or other complaints.     PAST MEDICAL HISTORY:   Past Medical History:   Diagnosis Date     Conductive hearing loss 01/01/2017     Depressive disorder 01/01/2017     Encephalocele (H)      Migraines 01/01/2015     Recurrent otitis media 01/01/2016       PAST SURGICAL HISTORY:   Past Surgical History:   Procedure Laterality Date     CRANIOTOMY MIDDLE FOSSA, EXCISE ACOUSTIC NEUROMA, COMBINED Right 1/11/2018    Procedure: COMBINED CRANIOTOMY MIDDLE FOSSA, EXCISE ACOUSTIC NEUROMA;;  Surgeon: Ramirez Lama MD;  Location: [facility] OR     INSERT DRAIN LUMBAR N/A 1/11/2018    Procedure: INSERT DRAIN LUMBAR;  Lumbar Drain Placement, Right Middle Cranial Fossa Encephalocele Repair  latex safe ;  Surgeon: Ramirez Lama MD;  Location: [facility] OR     MEDICATIONS:   Current Outpatient Medications   Medication     acetaminophen (TYLENOL) 325 MG tablet     FLUoxetine (PROZAC) 10 MG capsule     mineral oil-hydrophilic petrolatum (AQUAPHOR)     order for DME     levETIRAcetam 1000 MG TABS     No current facility-administered medications for this visit.      ALLERGIES:  No Known Allergies    PHYSICAL EXAMINATION:   Constitutional:  Pleasant well-appearing, in no apparent distress  Eyes:   Extraocular movements are intact, no nystagmus   Ears, examined under the microscope.   Right: external ear and pinna are within normal limits; external auditory canal is clear and of large caliber. The tympanic membrane is visualized and there does not appear to be any encephalocele visible. . However,  there is again pulsatile movement to the TM (we did a tympanocentesis at the last visit for this finding and found that there was only air in the middle ear space). No effusion.  Neurologic:  Cranial nerves II through VII and IX through XII are intact, specifically facial nerve function is normal and symmetric.    AUDIOGRAM:    Left ear normal hearing with air-bone gaps 250-500 Hz. Right ear moderate CHL rising to normal hearing.  The air-conduction thresholds between 1000 and 4000 Hz have worsened by 15-25 dB.  Tymps: type A estefani  Reflexes: Left ipsi and right contra present, all other are absent.     ASSESSMENT AND PLAN:   Michelle Fisher is a 33 year old with a history of encephalocele with CSF leak through middle fossa which was repaired surgically on 1/11/2018. A portion of the encephalocele could not be extracted fully from above due to insinuation around the ossicles and inferior extent. The procedure was completed with the plan to consider further removal via transcanal approach at a later date.  Visually, the encephalocele that was left behind has undergone an involution as we would expect.  At the same time, her audiometric pattern is worsening.  I do not know if this represents ossicular fixation or scar tissue related to the residual material as it involuted or if there has been a recurrence of the encephalocele in an area that I cannot see since it would be above the rim of the annulus.  It does not appear that there is a CSF leak symptomatically and we did a tympanic centesis for this curious pulsation of the tympanic membrane at her last visit and there was nothing within the middle ear space and again the tympanogram is type A today.    We talked about different approaches.  We determined that we will obtain a temporal bone CT scan to assess the middle ear and ossicular status as well as the status of the repair.  We have discussed again whether middle ear exploration would be advisable to see if we  can improve her hearing.  Another alternative would be fitting with a hearing aid.  She indicated that she would rather have a hearing aid fitted unless more surgery is needed for the skull base defect.    Addendum: Temporal bone CT scan was reviewed.  There has been even more involution and essentially complete resolution of the tissue that have been left behind.  The gap between the plate and the otic capsule has also filled with a bone spike.  Overall this is a markedly improved appearance and there is no evidence of CSF within the middle ear or in the mastoid air cells which are aerated.  We will plan to follow-up with the patient with a repeat audiogram in 4-6 months.  She is cleared for hearing aid at her discretion.    Alexia Oglesby MD

## 2019-02-19 NOTE — NURSING NOTE
"Chief Complaint   Patient presents with     RECHECK     follow up with audio      Blood pressure 146/89, pulse 81, height 1.52 m (4' 11.84\"), weight 56.7 kg (125 lb).    Michael Bowman LPN'    "

## 2019-02-20 ENCOUNTER — PATIENT OUTREACH (OUTPATIENT)
Dept: OTOLARYNGOLOGY | Facility: CLINIC | Age: 34
End: 2019-02-20

## 2019-02-20 NOTE — PATIENT INSTRUCTIONS
Spoke with pts mother regarding results of recent CT scan. Relayed per Dr. Oglesby results show that the extra tissue is gone and the area is now filled with air which is normal. The bone on the left side is thinning, but this does not need to be addressed with any intervention we would recommend continuing to watch this. There is no procedure indicated at this time. We would recommend following up in 4 months with a new hearing test. Pts mother states she will relay this to pt and denies any additional questions at this time. Licha GAGNON RNCC

## 2019-03-18 NOTE — PROGRESS NOTES
Michelle Fisher is a 32 year old seen in [facility] today for follow-up of her surgically repaired right temporal lobe encephalocele.     History of Present Illness:  The patient had extensive right temporal lobe encephalocele that was insinuated between the members of her ossicular chain and that filled much of the middle ear. She underwent surgery to remove the encephalocele via middle fossa approach but not all of the tissue could be removed without ossicular disruption. The surgery was completed with plans to monitor the area and consider removal via transcanal approach at a later time. The patient was seen in follow-up on 3/20/18 where otologic exam revealed improved encephalocele with no evidence of CSF leak.  We last saw her last summer and the tissue was found to have atrophied further.  Interval imaging confirmed this.  We made a plan for ongoing observation.  She reports today that she feels pressure in her right ear when she bends  down and stands back up again.  Therefore, she is moving slowly when she is bending in order not to feel the pressure as much.  She does not feel like she hears well out of the right ear but also does not feel like this is any different from the time of her surgery.  She reports that she is continuing to have dizziness.  We talked about this in more detail today and confirmed that this always starts with a headache that goes across her head and is associated with light sensitivity and nausea and sometimes vomiting.  She will then get a spinning sensation.  Her mom has migraines that were prevalent in her young adulthood and that stopped by age 45.  The patient was having this dizziness with the headaches prior to her encephalocele diagnosis and surgery and she recalled this today during the appointment.  She finds that sometimes when she eats her headaches are worse.  She is taking Prozac but does not use a migraine preventive strategy.  She has some chronic  headaches and dizziness which are unchanged and that are secondary to migraine. No double vision, facial paralysis, numbness, trouble swallowing, fever, chills, nausea or other complaints.     PAST MEDICAL HISTORY:   Past Medical History:   Diagnosis Date     Conductive hearing loss 01/01/2017     Depressive disorder 01/01/2017     Encephalocele (H)      Migraines 01/01/2015     Recurrent otitis media 01/01/2016       PAST SURGICAL HISTORY:   Past Surgical History:   Procedure Laterality Date     CRANIOTOMY MIDDLE FOSSA, EXCISE ACOUSTIC NEUROMA, COMBINED Right 1/11/2018    Procedure: COMBINED CRANIOTOMY MIDDLE FOSSA, EXCISE ACOUSTIC NEUROMA;;  Surgeon: Ramirez Lama MD;  Location: [facility] OR     INSERT DRAIN LUMBAR N/A 1/11/2018    Procedure: INSERT DRAIN LUMBAR;  Lumbar Drain Placement, Right Middle Cranial Fossa Encephalocele Repair  latex safe ;  Surgeon: Ramirez Lama MD;  Location: [facility] OR     MEDICATIONS:   Current Outpatient Medications   Medication     acetaminophen (TYLENOL) 325 MG tablet     FLUoxetine (PROZAC) 10 MG capsule     mineral oil-hydrophilic petrolatum (AQUAPHOR)     order for DME     levETIRAcetam 1000 MG TABS     No current facility-administered medications for this visit.      ALLERGIES:  No Known Allergies    PHYSICAL EXAMINATION:   Constitutional:  Pleasant well-appearing, in no apparent distress  Eyes:   Extraocular movements are intact, no nystagmus   Ears, examined under the microscope.   Right: external ear and pinna are within normal limits; external auditory canal is clear and of large caliber. The tympanic membrane is visualized and there does not appear to be any encephalocele visible. . However, there is again pulsatile movement to the TM (we did a tympanocentesis at the last visit for this finding and found that there was only air in the middle ear space). No effusion.  Neurologic:  Cranial nerves II through VII and IX through XII are intact, specifically facial nerve  function is normal and symmetric.    AUDIOGRAM:    Left ear normal hearing with air-bone gaps 250-500 Hz. Right ear moderate CHL rising to normal hearing.  The air-conduction thresholds between 1000 and 4000 Hz have worsened by 15-25 dB.  Tymps: type A estefani  Reflexes: Left ipsi and right contra present, all other are absent.     ASSESSMENT AND PLAN:   Michelle Fisher is a 33 year old with a history of encephalocele with CSF leak through middle fossa which was repaired surgically on 1/11/2018. A portion of the encephalocele could not be extracted fully from above due to insinuation around the ossicles and inferior extent. The procedure was completed with the plan to consider further removal via transcanal approach at a later date.  Visually, the encephalocele that was left behind has undergone an involution as we would expect.  At the same time, her audiometric pattern is worsening.  I do not know if this represents ossicular fixation or scar tissue related to the residual material as it involuted or if there has been a recurrence of the encephalocele in an area that I cannot see since it would be above the rim of the annulus.  It does not appear that there is a CSF leak symptomatically and we did a tympanic centesis for this curious pulsation of the tympanic membrane at her last visit and there was nothing within the middle ear space and again the tympanogram is type A today.    We talked about different approaches.  We determined that we will obtain a temporal bone CT scan to assess the middle ear and ossicular status as well as the status of the repair.  We have discussed again whether middle ear exploration would be advisable to see if we can improve her hearing.  Another alternative would be fitting with a hearing aid.  She indicated that she would rather have a hearing aid fitted unless more surgery is needed for the skull base defect.    Alexia Oglesby MD  Otology & Neurotology  [facility]  [place]    Addendum: Temporal bone CT scan was reviewed.  There has been even more involution and essentially complete resolution of the tissue that have been left behind.  The gap between the plate and the otic capsule has also filled with a bone spike.  Overall this is a markedly improved appearance and there is no evidence of CSF within the middle ear or in the mastoid air cells which are aerated.  We will plan to follow-up with the patient with a repeat audiogram in 4-6 months.  She is cleared for hearing aid at her discretion.

## 2020-03-11 ENCOUNTER — HEALTH MAINTENANCE LETTER (OUTPATIENT)
Age: 35
End: 2020-03-11

## 2020-06-25 ENCOUNTER — TELEPHONE (OUTPATIENT)
Dept: OTOLARYNGOLOGY | Facility: CLINIC | Age: 35
End: 2020-06-25

## 2020-07-13 ENCOUNTER — OFFICE VISIT (OUTPATIENT)
Dept: AUDIOLOGY | Facility: CLINIC | Age: 35
End: 2020-07-13
Payer: COMMERCIAL

## 2020-07-13 DIAGNOSIS — H90.71 MIXED CONDUCTIVE AND SENSORINEURAL HEARING LOSS OF RIGHT EAR WITH UNRESTRICTED HEARING OF LEFT EAR: Primary | ICD-10-CM

## 2020-07-13 NOTE — PROGRESS NOTES
AUDIOLOGY REPORT    SUMMARY: Audiology visit completed. See audiogram for results.      RECOMMENDATIONS: Follow-up with ENT.    Gina Gomez.  Licensed Audiologist  [place] # [id number]

## 2020-07-13 NOTE — Clinical Note
Hi Dr. Oglesby-    Please see the audiogram from today. She had normal hearing left and moderate mixed HL rising to normal hearing right- compared to 2/19/19 improvement noted in air conduction by 10-25 dB 0.25-6 kHz. She was pretty unreliable in responding initially, but I feel that I got a reliable test after re-instruction and consistent encouragement. Negative eric and SRTs aligned.    Thanks!  Moustapha

## 2020-12-27 ENCOUNTER — HEALTH MAINTENANCE LETTER (OUTPATIENT)
Age: 35
End: 2020-12-27

## 2021-04-25 ENCOUNTER — HEALTH MAINTENANCE LETTER (OUTPATIENT)
Age: 36
End: 2021-04-25

## 2021-10-09 ENCOUNTER — HEALTH MAINTENANCE LETTER (OUTPATIENT)
Age: 36
End: 2021-10-09

## 2022-02-14 ENCOUNTER — TRANSFERRED RECORDS (OUTPATIENT)
Dept: HEALTH INFORMATION MANAGEMENT | Facility: CLINIC | Age: 37
End: 2022-02-14
Payer: COMMERCIAL

## 2022-05-21 ENCOUNTER — HEALTH MAINTENANCE LETTER (OUTPATIENT)
Age: 37
End: 2022-05-21

## 2022-06-13 ENCOUNTER — TELEPHONE (OUTPATIENT)
Dept: OTOLARYNGOLOGY | Facility: CLINIC | Age: 37
End: 2022-06-13
Payer: COMMERCIAL

## 2022-06-13 NOTE — TELEPHONE ENCOUNTER
LM x2 that pt needs hearing test before her appt tomorrow. WIN available today at 3pm if she is able to make this work. CB number provided.

## 2022-06-14 ENCOUNTER — OFFICE VISIT (OUTPATIENT)
Dept: OTOLARYNGOLOGY | Facility: CLINIC | Age: 37
End: 2022-06-14
Payer: COMMERCIAL

## 2022-06-14 ENCOUNTER — TELEPHONE (OUTPATIENT)
Dept: OTOLARYNGOLOGY | Facility: CLINIC | Age: 37
End: 2022-06-14

## 2022-06-14 ENCOUNTER — OFFICE VISIT (OUTPATIENT)
Dept: AUDIOLOGY | Facility: CLINIC | Age: 37
End: 2022-06-14

## 2022-06-14 VITALS
BODY MASS INDEX: 22.38 KG/M2 | WEIGHT: 114 LBS | SYSTOLIC BLOOD PRESSURE: 132 MMHG | HEIGHT: 60 IN | HEART RATE: 71 BPM | DIASTOLIC BLOOD PRESSURE: 86 MMHG | TEMPERATURE: 98.6 F

## 2022-06-14 VITALS
TEMPERATURE: 98.6 F | SYSTOLIC BLOOD PRESSURE: 132 MMHG | BODY MASS INDEX: 22.38 KG/M2 | HEART RATE: 71 BPM | WEIGHT: 114 LBS | DIASTOLIC BLOOD PRESSURE: 85 MMHG | HEIGHT: 60 IN

## 2022-06-14 DIAGNOSIS — D33.3 ACOUSTIC NEUROMA (H): Primary | ICD-10-CM

## 2022-06-14 DIAGNOSIS — Q01.8 TEMPORAL ENCEPHALOCELE (H): Primary | ICD-10-CM

## 2022-06-14 DIAGNOSIS — H91.90 HEARING LOSS: Primary | ICD-10-CM

## 2022-06-14 DIAGNOSIS — H90.11 CONDUCTIVE HEARING LOSS OF RIGHT EAR WITH UNRESTRICTED HEARING OF LEFT EAR: ICD-10-CM

## 2022-06-14 DIAGNOSIS — H93.8X2 EAR FULLNESS, LEFT: ICD-10-CM

## 2022-06-14 DIAGNOSIS — H90.71 MIXED CONDUCTIVE AND SENSORINEURAL HEARING LOSS OF RIGHT EAR WITH UNRESTRICTED HEARING OF LEFT EAR: Primary | ICD-10-CM

## 2022-06-14 PROCEDURE — 92557 COMPREHENSIVE HEARING TEST: CPT | Performed by: AUDIOLOGIST

## 2022-06-14 PROCEDURE — 99205 OFFICE O/P NEW HI 60 MIN: CPT | Performed by: NEUROLOGICAL SURGERY

## 2022-06-14 PROCEDURE — 92504 EAR MICROSCOPY EXAMINATION: CPT | Performed by: OTOLARYNGOLOGY

## 2022-06-14 PROCEDURE — 99203 OFFICE O/P NEW LOW 30 MIN: CPT | Mod: 25 | Performed by: OTOLARYNGOLOGY

## 2022-06-14 PROCEDURE — 92550 TYMPANOMETRY & REFLEX THRESH: CPT | Performed by: AUDIOLOGIST

## 2022-06-14 ASSESSMENT — PAIN SCALES - GENERAL
PAINLEVEL: NO PAIN (0)
PAINLEVEL: NO PAIN (0)

## 2022-06-14 ASSESSMENT — PATIENT HEALTH QUESTIONNAIRE - PHQ9: SUM OF ALL RESPONSES TO PHQ QUESTIONS 1-9: 9

## 2022-06-14 NOTE — NURSING NOTE
Chief Complaint   Patient presents with     RECHECK     1 year follow up with audio prior      Blood pressure 132/86, pulse 71, temperature 98.6  F (37  C), height 1.524 m (5'), weight 51.7 kg (114 lb).    Michael Bowman LPN

## 2022-06-14 NOTE — Clinical Note
6/14/2022       RE: Michelle Fisher  [address]  [place] [address]     Dear Colleague,    Thank you for referring your patient, Michelle Fisher, to the [facility] EAR NOSE AND THROAT CLINIC [place] at [facility]. Please see a copy of my visit note below.    [facility]  Department of Neurosurgery  Center for Skull Base and Pituitary Surgery    June 14, 2022    Reason for visit: temporal encephalocele s/p middle fossa repair 2018 (Daina/Mendel), followup visit      It was a pleasure to see Ms. Fisher in the Center for Skull Base and Pituitary Surgery today as a new patient.  As you recall, Ms. Fisher is a 36-year-old female who initially presented with a right conductive hearing loss and was found with an encephalocele.  She underwent repair of a very large tegmen defect with Dr. Lama and Dr. Oglesby in 2018.  She subsequently did well.  She was last seen in 2019. She reports pulsing sensation over the left side that started several months ago. It is difficult for her to elaborate on exactly what this feels like. She reports stable hearing on the right side but the tinnitus has been fluctuating and increasing most recently.    Past medical history: Conductive hearing loss, encephalocele status post repair as above, migraines, depression, recurrent otitis media      Medications: Acetaminophen, fluoxetine    Allergies: No known drug allergies    Social history: Noncontributory    Family history: Noncontributory    Exam: She is fluent with speech and very pleasant.  Pressure ocular movements are intact.  Her facial sensation is intact.  Her face is symmetric with activation, rated as a House-Brackman 1.  Her tongue is midline.  She has no pronator drift.  She is full strength in all extremities.  Her incision is well-healed.    Audiogram: Her audiogram today was reviewed together and demonstrates a right hearing loss at the lower frequencies.  The  pure tone average is 38 decibels in the right ear and 12 decibels in the left ear.  Word recognition score is 100% at 80 decibels in the right ear and 100% at 50 decibels in the left ear.    Imaging: We reviewed her new CT scan performed on 2/14/2022 and compared this to prior MRI and CT scans from 2017, 2018, 2019.  Her preoperative scans demonstrated a large tegmen defect over the middle ear and tegmen with fluid versus soft tissue signal in the middle ear spaces.  MRI at that time demonstrated evidence of an encephalocele.  Her postoperative CT in 2019 demonstrated good pneumatization of the middle ear and mastoid air cells.  A metal plate appears intact along the tegmen.  Her new CT scan on the right side demonstrate that the metal plate is still in place over the tegmen with good pneumatization of the middle ear and mastoid air cells.  On the left side, there appears to be dehiscence of the tegmen over the middle ear but maintained pneumatization in the middle ear over most of this area.  This does not appear grossly changed from prior scans back to 2019 in 2017.    Assessment:  1.  History of right tegmen defect, temporal encephalocele status post middle fossa repair in 2018 (Daina/Mendel)  2.  AAO-HNS hearing class B on the right    Plan:  1.  Together we reviewed the new imaging in setting of her prior imaging. We are pleased to see no evidence of changes or new encephaloceles bilaterally. The area of her prior repair on the right appears unchanged, and the left side shows a stable dehiscence of the tegmen tympani. Without evidence of a CSF leak, we did not find any need for any new procedures today.  2.  We discussed warning signs for which she should contact us to be seen for reevaluation.   3.  She was encouraged to see her ophthalmologist annually to evaluate for papilledema  4.  She is interested in pursuing a headache neurology evaluation. She would like to pursue this at [facility] where she  works. Should she wish to be evaluated here, she will let us know and we can place a referral.  5.  We encouraged her to call us with any questions or concerns moving forward.      It has been a pleasure to participate in the care of your patient. Please feel free to contact me if I may be of any assistance for Ms. Fisher.    Sincerely,  Alberto Jane MD      65 minutes spent on the date of the encounter doing chart review, review of outside records, review of test results, interpretation of tests, patient visit, documentation and discussion with other provider(s)        Depression Response    Patient completed the PHQ-9 assessment for depression and scored >9? Yes  Question 9 on the PHQ-9 was positive for suicidality? No  Does patient have current mental health provider? No    Is this a virtual visit? No    I personally notified the following: clinic nurse   Patient declined referral to mental health              Again, thank you for allowing me to participate in the care of your patient.      Sincerely,    Alberto Jane MD

## 2022-06-14 NOTE — NURSING NOTE
Chief Complaint   Patient presents with     RECHECK     Yearly follow up with audio      Blood pressure 132/85, pulse 71, temperature 98.6  F (37  C), height 1.524 m (5'), weight 51.7 kg (114 lb).    Michael Bowman LPN

## 2022-06-14 NOTE — PROGRESS NOTES
[facility]  Department of Neurosurgery  Center for Skull Base and Pituitary Surgery    June 14, 2022    Reason for visit: temporal encephalocele s/p middle fossa repair 2018 (Daina/Mendel), followup visit      It was a pleasure to see Ms. Fisher in the Center for Skull Base and Pituitary Surgery today as a new patient.  As you recall, Ms. Fisher is a 36-year-old female who initially presented with a right conductive hearing loss and was found with an encephalocele.  She underwent repair of a very large tegmen defect with Dr. Lama and Dr. Oglesby in 2018.  She subsequently did well.  She was last seen in 2019. She reports pulsing sensation over the left side that started several months ago. It is difficult for her to elaborate on exactly what this feels like. She reports stable hearing on the right side but the tinnitus has been fluctuating and increasing most recently.    Past medical history: Conductive hearing loss, encephalocele status post repair as above, migraines, depression, recurrent otitis media      Medications: Acetaminophen, fluoxetine    Allergies: No known drug allergies    Social history: Noncontributory    Family history: Noncontributory    Exam: She is fluent with speech and very pleasant.  Pressure ocular movements are intact.  Her facial sensation is intact.  Her face is symmetric with activation, rated as a House-Brackman 1.  Her tongue is midline.  She has no pronator drift.  She is full strength in all extremities.  Her incision is well-healed.    Audiogram: Her audiogram today was reviewed together and demonstrates a right hearing loss at the lower frequencies.  The pure tone average is 38 decibels in the right ear and 12 decibels in the left ear.  Word recognition score is 100% at 80 decibels in the right ear and 100% at 50 decibels in the left ear.    Imaging: We reviewed her new CT scan performed on 2/14/2022 and compared this to prior MRI and CT scans from 2017, 2018, 2019.   Her preoperative scans demonstrated a large tegmen defect over the middle ear and tegmen with fluid versus soft tissue signal in the middle ear spaces.  MRI at that time demonstrated evidence of an encephalocele.  Her postoperative CT in 2019 demonstrated good pneumatization of the middle ear and mastoid air cells.  A metal plate appears intact along the tegmen.  Her new CT scan on the right side demonstrate that the metal plate is still in place over the tegmen with good pneumatization of the middle ear and mastoid air cells.  On the left side, there appears to be dehiscence of the tegmen over the middle ear but maintained pneumatization in the middle ear over most of this area.  This does not appear grossly changed from prior scans back to 2019 in 2017.    Assessment:  1.  History of right tegmen defect, temporal encephalocele status post middle fossa repair in 2018 (Daina/Mendel)  2.  AAO-HNS hearing class B on the right    Plan:  1.  Together we reviewed the new imaging in setting of her prior imaging. We are pleased to see no evidence of changes or new encephaloceles bilaterally. The area of her prior repair on the right appears unchanged, and the left side shows a stable dehiscence of the tegmen tympani. Without evidence of a CSF leak, we did not find any need for any new procedures today.  2.  We discussed warning signs for which she should contact us to be seen for reevaluation.   3.  She was encouraged to see her ophthalmologist annually to evaluate for papilledema  4.  She is interested in pursuing a headache neurology evaluation. She would like to pursue this at [facility] where she works. Should she wish to be evaluated here, she will let us know and we can place a referral.  5.  We encouraged her to call us with any questions or concerns moving forward.      It has been a pleasure to participate in the care of your patient. Please feel free to contact me if I may be of any assistance for Ms.  Phillip.    Sincerely,  Alberto Jane MD      65 minutes spent on the date of the encounter doing chart review, review of outside records, review of test results, interpretation of tests, patient visit, documentation and discussion with other provider(s)

## 2022-06-14 NOTE — PATIENT INSTRUCTIONS
Thank you for choosing [facility]. You were seen in the Skull Base Clinic today with Dr. Oglesby and Dr. Jane.     Next steps:  We recommend following up with ophthalmology and headache neurology. You told us that you'd like to follow up with these specialty departments at [facility]. If you need referrals or assistance from our team, please don't hesitate to contact us.  Return to our clinic as needed.        Contact Us  Send a [employer] message to your provider. Our team will respond to you via [employer]. Occasionally, we will need to call you to get further information.  Call the clinic and speak with a call center team member - they will route your call appropriately.   If you'd like to speak directly with a nurse, please find our contact information below.     ENT Clinic: [phone]        Neurosurgery Clinic: [phone]    Ashley Champagne MA, RN, PHN, NBC-HWC  RN Care Coordinator, Skull Base Surgery  Direct: [phone]    Kelsey Ordaz, RN, BSN, PHN, LSN, CPN  RN Care Coordinator, ENT  Direct: [phone]

## 2022-06-14 NOTE — LETTER
CENTER FOR SKULL BASE AND PITUITARY SURGERY  [facility] EAR NOSE AND THROAT CLINIC [address]  [address]LO[address]  [facility] [address]  Phone: [phone]  Fax: [phone]          6/14/2022    RE:   Michelle Fisher  [address]  [place] [address]      Dear Colleague,    Thank you for referring your patient, Michelle Fisher, to the Center for Skull Base and Pituitary Surgery. Please see a copy of my visit note below.      [facility]  Department of Neurosurgery  Center for Skull Base and Pituitary Surgery    June 14, 2022    Reason for visit: temporal encephalocele s/p middle fossa repair 2018 (Daina/Mendel), followup visit      It was a pleasure to see Ms. Fisher in the Center for Skull Base and Pituitary Surgery today as a new patient.  As you recall, Ms. Fisher is a 36-year-old female who initially presented with a right conductive hearing loss and was found with an encephalocele.  She underwent repair of a very large tegmen defect with Dr. Lama and Dr. Oglesby in 2018.  She subsequently did well.  She was last seen in 2019. She reports pulsing sensation over the left side that started several months ago. It is difficult for her to elaborate on exactly what this feels like. She reports stable hearing on the right side but the tinnitus has been fluctuating and increasing most recently.    Past medical history: Conductive hearing loss, encephalocele status post repair as above, migraines, depression, recurrent otitis media      Medications: Acetaminophen, fluoxetine    Allergies: No known drug allergies    Social history: Noncontributory    Family history: Noncontributory    Exam: She is fluent with speech and very pleasant.  Pressure ocular movements are intact.  Her facial sensation is intact.  Her face is symmetric with activation, rated as a House-Brackman 1.  Her tongue is midline.  She has no pronator drift.  She is full strength in all extremities.  Her incision  is well-healed.    Audiogram: Her audiogram today was reviewed together and demonstrates a right hearing loss at the lower frequencies.  The pure tone average is 38 decibels in the right ear and 12 decibels in the left ear.  Word recognition score is 100% at 80 decibels in the right ear and 100% at 50 decibels in the left ear.    Imaging: We reviewed her new CT scan performed on 2/14/2022 and compared this to prior MRI and CT scans from 2017, 2018, 2019.  Her preoperative scans demonstrated a large tegmen defect over the middle ear and tegmen with fluid versus soft tissue signal in the middle ear spaces.  MRI at that time demonstrated evidence of an encephalocele.  Her postoperative CT in 2019 demonstrated good pneumatization of the middle ear and mastoid air cells.  A metal plate appears intact along the tegmen.  Her new CT scan on the right side demonstrate that the metal plate is still in place over the tegmen with good pneumatization of the middle ear and mastoid air cells.  On the left side, there appears to be dehiscence of the tegmen over the middle ear but maintained pneumatization in the middle ear over most of this area.  This does not appear grossly changed from prior scans back to 2019 in 2017.    Assessment:  1.  History of right tegmen defect, temporal encephalocele status post middle fossa repair in 2018 (Daina/Mendel)  2.  AAO-HNS hearing class B on the right    Plan:  1.  Together we reviewed the new imaging in setting of her prior imaging. We are pleased to see no evidence of changes or new encephaloceles bilaterally. The area of her prior repair on the right appears unchanged, and the left side shows a stable dehiscence of the tegmen tympani. Without evidence of a CSF leak, we did not find any need for any new procedures today.  2.  We discussed warning signs for which she should contact us to be seen for reevaluation.   3.  She was encouraged to see her ophthalmologist annually to evaluate for  papilledema  4.  She is interested in pursuing a headache neurology evaluation. She would like to pursue this at [facility] where she works. Should she wish to be evaluated here, she will let us know and we can place a referral.  5.  We encouraged her to call us with any questions or concerns moving forward.      It has been a pleasure to participate in the care of your patient. Please feel free to contact me if I may be of any assistance for Ms. Fisher.    Sincerely,  Alberto Jane MD      65 minutes spent on the date of the encounter doing chart review, review of outside records, review of test results, interpretation of tests, patient visit, documentation and discussion with other provider(s)        Depression Response    Patient completed the PHQ-9 assessment for depression and scored >9? Yes  Question 9 on the PHQ-9 was positive for suicidality? No  Does patient have current mental health provider? No    Is this a virtual visit? No    I personally notified the following: clinic nurse   Patient declined referral to mental health                Again, thank you for allowing me to participate in the care of your patient.      Sincerely,    Alberto Jane MD

## 2022-06-14 NOTE — LETTER
Date:Vanessa 15, 2022      Patient was self referred, no letter generated. Do not send.        [facility] Health Information

## 2022-06-14 NOTE — PROGRESS NOTES
Depression Response    Patient completed the PHQ-9 assessment for depression and scored >9? Yes  Question 9 on the PHQ-9 was positive for suicidality? No  Does patient have current mental health provider? No    Is this a virtual visit? No    I personally notified the following: clinic nurse   Patient declined referral to mental health

## 2022-06-14 NOTE — PROGRESS NOTES
AUDIOLOGY REPORT    SUMMARY: Audiology visit completed. See audiogram for results.      RECOMMENDATIONS: Follow-up with ENT.    Danielle Elias, [facility]  Licensed Audiologist  [place] #[id number]

## 2022-06-14 NOTE — LETTER
2022       RE: Michelle Fisher  [address]  [place] [address]     Dear Colleague,    Thank you for referring your patient, Michelle Fisher, to the [facility] [place] [facility]. Please see a copy of my visit note below.      [facility]      Name: Michelle Fisher  MRN: [id number]  Age: 36 year old  : 2022       History of Present Illness:   Michelle Fisher is a 36 year old female with a history of right middle ear encephalocele who was seen in the [facility] after an evaluation at another clinic suggested that she needed additional surgery.  She is seen in conjunction with my colleague in neurosurgery, Dr. Alberto Jane.    Michelle presented initially with right conductive hearing loss and was found with an encephalocele.  She underwent repair of the very large tegmen defect with me and Dr. Lama  in 2018 and had an excellent result.  This was demonstrated on imaging.  The encephalocele had insinuated itself into the middle ear around all of the ossicles and we were able to confirm that the tissue largely was removed or dissolved over following years.  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 She was last seen in 2019.     Several months ago, she began to report a pulsating sensation over the left side of her head and face and ear.  The hearing has not changed.  She is having headaches consistent with her prior migraines and has been concerned about dizziness at prior appointments as she is today, which seems to temporally correlate with migraine activity as well.  It was difficult for her to elaborate on what the pulsating feels like for us to understand it a bit better.  She also notes nonpulsatile tinnitus on both sides.  The symptoms are significantly concerning to her.    She  sought evaluation with Dr. Sow.  She was given a diagnosis of third window syndrome and surgery was recommended for the left side.  She is seeking additional input into this given that she had had surgery this previously.    Review of Systems:   Pertinent items are noted in HPI or as in patient entered ROS below, remainder of complete ROS is negative.    ENT ROS 6/14/2022   Constitutional: -   Neurology: Headache   Psychology: -   Ears, Nose, Throat: Hearing loss, Ear pain, Ringing/noise in ears   Gastrointestinal/Genitourinary: -   Musculoskeletal: -   Other: -         Physical Exam:   /86   Pulse 71   Temp 98.6  F (37  C)   Ht 1.524 m (5')   Wt 51.7 kg (114 lb)   BMI 22.26 kg/m    Constitutional:  The patient was accompanied by her mom, well-groomed, and in no acute distress.     Skin: Normal:  warm and pink without rash   Neurologic: Alert and oriented x 3.  CN's III-XII within normal limits.  Voice normal.    Psychiatric: The patient's affect was calm, cooperative, and appropriate.     Communication:  Normal; communicates verbally, normal voice quality.   Respiratory: Breathing comfortably without stridor or exertion of accessory muscles.    Head/Face:  No lesions or scars. No sinus tenderness.    Salivary glands -  Normal size, no tenderness, swelling, or palpable masses.  The middle fossa incision has healed very well and there is no palpable scalp tenderness.   Eyes: Pupils were equal and reactive.  Extraocular movement intact.     Ears: Pinnae and tragus non-tender.        Otologic microscope exam:  Right ear: The ear canal is lined with healthy skin and the tympanic membrane is intact.  The middle ear space is aerated and the tympanic membrane is mobile, consistent with no evidence of recurrent effusion.  Left ear: The ear canal is lined with healthy skin and the tympanic membrane is intact and normal in appearance.  The middle ear space is aerated and the tympanic membrane is  mobile.    Audiogram:  AUDIOGRAM: She underwent an audiogram today. This demonstrated:      The speech reception threshold is 40 dB on the right, 10 dB on the left, with 100% word recognition bilaterally. Tympanograms showed A type on the right and type A on the left.    *Note that the audiologist today indicated that significant encouragement was required and that threshold improved during the testing.  Reliability was not considered ideal suggesting that hearing might be better in the right ear than this shows.  The left hearing thresholds are stable and normal which is reassuring given that this is the side of the symptoms.    *Note that the audiogram from February 2022 at [facility] was also reviewed and at that time the tested thresholds in the right ear were markedly worse in the mid and high frequencies.  This audiogram was independently reviewed and the images are scanned into our system.    Imaging:  CT Temporal Bones wo Contrast  Narrative: CT TEMPORAL BONES WO CONTRAST 2/19/2019 12:50 PM    Provided History: History of encephalocele repair; Hearing loss  ICD-10: Hearing loss     Comparison: CT temporal bone 4/2/2018.     Technique: Using multidetector thin collimation helical acquisition  technique, axial and coronal thin section CT images were reconstructed  through both temporal bones. Additional reconstructions performed in  the planes of Poschl and Stenver were also obtained. Images were  reviewed in a bone window.    Findings:     Right temporal bone: Prosthetic mesh covering the tegmen mastoideum  and tegmen tympani. Previously noted defect at the medial margin of  the mesh appears to have sealed off with sis osseous formation. No  definite communication between the right middle ear and posterior  cranial fossa. The mastoid air cells are clear. There is no debris in  the external auditory canal. The tympanic membrane is intact. There is  no fluid or soft tissue thickening in the right middle  ear cavity. The  bony ossicles are intact and in normal alignment. The epitympanum is  clear. The bony scutum is sharp. There is probable focal cortical bone  defect over the right superior semicircular canal. The internal  auditory canal and the labyrinthine structures are within normal  limits. The facial nerve canal appears normal along its course.    Left temporal bone: There is a 6.2 mm dehiscence of the tegmen tympani  with inferior protrusion of likely meningeal covered brain parenchyma  into the epitympanum of left middle ear without any fluid in the left  tympanic cavity. The mastoid air cells are clear. There is no debris  in the external auditory canal. The tympanic membrane is intact. The  bony scutum is sharp. The internal auditory canal and the labyrinthine  structures are within normal limits. The facial nerve canal appears  normal along its course.  Impression: Impression:   1. Left: Dehiscence of the left tegmen tympani leading to possible  left meningoencephalocele contacting the malleoincudal junction, but  without any fluid in the left middle ear cavity.  2. Right: Postsurgical changes of prosthetic mesh repair right  encephalocele . Previously seen defect along the medial prosthetic  margin appears to have sealed off with new bone formation, and the  fluid or debris in the epitympanum on the prior study has resolved.  Minimal debris along the stapes.    I have personally reviewed the examination and initial interpretation  and I agree with the findings.    DULCE WEAVER MD    The CT was independently reviewed.  The right ear appearance is excellent, with stability of the repair and healing over the middle ear with out any evidence of further encephalocele or effusion.  On the left, we see continued demonstration of contact between the middle fossa dura or a meningoencephalocele with the head of the malleus.  This was known from prior.  We do not see any evidence of CSF leak given that the  middle ear space and mastoid air cells are aerated and this fits with the audiogram and physical exam.  We also do not see evidence of a third window lesion as bone over the semicircular canals is intact and there is not a clear dehiscence into the cochlea.     Assessment and Plan:  Michelle Fisher is a 36 year old female with:  1) History of right middle ear meningoencephalocele, repaired in 2018, with excellent stability of the results and no evidence of disease recurrence.  Expectant management recommended.    2) CT evidence of contact between the middle fossa dura or a meningoencephalocele with the head of the malleus, which has been present on prior imaging.  There is no evidence of active CSF leak.  Given that this is her better hearing ear and that there are no apparent hearing changes, we would recommend ongoing observation for this rather than middle fossa surgery at this time.  We have previously reviewed the need for pneumococcal vaccination and she and her mom are both inclined to hold off on middle fossa surgery on the side unless it is absolutely necessary for worsening symptoms or active CSF leak.    3) Additional pulsating sensation on the left, which is difficult to characterize.  It is not pulsatile tinnitus as we clarified that on our conversation today.  We also do not see evidence of a third window lesion and do not recommend any sort of procedure to her round window or otherwise in this, her better hearing ear.  It is possible that she is perceiving pulsation of the brain on the malleus head and we discussed this today, but again she does not feel like it is a pulsing sound and has difficulty describing the bothersome sensation.  Our goal is to avoid harm in this instance and, given that there is limited enthusiasm for additional middle fossa surgery, expectant management is likely to be a good first choice.    4) We do recommend that she be evaluated for papilledema and follow-up with  neuro-ophthalmology.  She and her family's preference is to not do this through our system but rather to go through her workplace and so she is going to pursue this but if there are difficulties arranging it she will contact us back.  We are considering this given her history of bilateral tegmen thinning and headaches.    5) Similarly, evaluation with neurology is recommended.  She has had recalcitrant migraine and also has headache and potential for increased intracranial hypertension and will be very warranted for this evaluation to be completed.  She does wish to do this through her workplace health system, but again we will be happy to arrange this for her if there are challenges.  We would hope to see the result of the consult with ophthalmology and neurology.        Alexia Oglesby MD  Otology & Neurotology  [facility]        Again, thank you for allowing me to participate in the care of your patient.      Sincerely,    Alexia Oglesby MD

## 2022-06-14 NOTE — LETTER
Date:July 13, 2022      Patient was self referred, no letter generated. Do not send.        [facility] Health Information

## 2022-06-14 NOTE — TELEPHONE ENCOUNTER
Records Requested  06/14/22    Facility  [facility] - Dr Sow   Outcome Sent a fax for recs and imaging report     10AM: received recs and sent to scan. Called [facility] for images to be pushed - Amay   2/14/22 CT Temporal Bone - [facility]   2/14/22 note from Dr Sow   2/14/22 audiogram

## 2022-06-26 NOTE — MR AVS SNAPSHOT
After Visit Summary   10/20/2017    Michelle Fisher    MRN: [id number]           Patient Information     Date Of Birth          1985        Visit Information        Provider Department      10/20/2017 10:30 AM Mindy Stock AuD [facility] Audiology        Today's Diagnoses     Conductive hearing loss of right ear with unrestricted hearing of left ear    -  1    Eustachian tube dysfunction, right        Tinnitus of right ear        Pain in right ear        Sensation of fullness in right ear           Follow-ups after your visit        Your next 10 appointments already scheduled     Oct 20, 2017 11:30 AM CDT   (Arrive by 11:15 AM)   NEW NEUROTOLOGY VISIT with MD JEAN Soria [facility] Ear Nose and Throat ([facility] [place])    [address] [address] [phone]              Who to contact     Please call your clinic at [phone] to:    Ask questions about your health    Make or cancel appointments    Discuss your medicines    Learn about your test results    Speak to your doctor   If you have compliments or concerns about an experience at your clinic, or if you wish to file a complaint, please contact [facility] Patient Relations at [phone] or email us at [email or web address]         Additional Information About Your Visit        MyChart Information     [employer] gives you secure access to your electronic health record. If you see a primary care provider, you can also send messages to your care team and make appointments. If you have questions, please call your primary care clinic.  If you do not have a primary care provider, please call [phone] and they will assist you.      [employer] is an electronic gateway that provides easy, online access to your medical records. With [employer], you can request a clinic appointment, read your test results, renew a prescription or communicate  We will call you with your strep results later this morning    No medications needed for laryngitis other than voice rest (avoid extremes of talking, singing, and yelling) for the next several days.    I would anticipate this to resolve over the next several days.       with your care team.     To access your existing account, please contact your [facility] or call [phone] for assistance.        Care EveryWhere ID     This is your Care EveryWhere ID. This could be used by other organizations to access your [place] medical records  [id number]         Blood Pressure from Last 3 Encounters:   No data found for BP    Weight from Last 3 Encounters:   No data found for Wt              We Performed the Following     AUDIOGRAM/TYMPANOGRAM - INTERFACE     [facility]og ([id number])     Reduced 52 - Tymps / Reflex   ([id number])        Primary Care Provider    None Specified       No primary provider on file.        Equal Access to Services     [facility]: Hadii aad ku hadasho Soomaali, waaxda luqadaha, qaybta kaalmada adeegyada, waxkaty stein hayaan adeeg sheree cesar . So [facility] [phone].    ATENCIÓN: Si habla español, tiene a terry disposición servicios gratuitos de asistencia lingüística. Llame al [phone].    We comply with applicable federal civil rights laws and [place] laws. We do not discriminate on the basis of race, color, national origin, age, disability, sex, sexual orientation, or gender identity.            Thank you!     Thank you for choosing [facility] AUDIOLOGY  for your care. Our goal is always to provide you with excellent care. Hearing back from our patients is one way we can continue to improve our services. Please take a few minutes to complete the written survey that you may receive in the mail after your visit with us. Thank you!             Your Updated Medication List - Protect others around you: Learn how to safely use, store and throw away your medicines at www.disposemymeds.org.      Notice  As of 10/20/2017 11:03 AM    You have not been prescribed any medications.

## 2022-07-12 NOTE — PROGRESS NOTES
[facility]      Name: Michelle Fisher  MRN: [id number]  Age: 36 year old  : 2022       History of Present Illness:   Michelle Fisher is a 36 year old female with a history of right middle ear encephalocele who [facility] after an evaluation at another clinic suggested that she needed additional surgery.  She is seen in conjunction with my colleague in neurosurgery, Dr. Alberto Jane.    Michelle presented initially with right conductive hearing loss and was found with an encephalocele.  She underwent repair of the very large tegmen defect with me and Dr. Lama  in 2018 and had an excellent result.  This was demonstrated on imaging.  The encephalocele had insinuated itself into the middle ear around all of the ossicles and we were able to confirm that the tissue largely was removed or dissolved over following years.  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 She was last seen in 2019.     Several months ago, she began to report a pulsating sensation over the left side of her head and face and ear.  The hearing has not changed.  She is having headaches consistent with her prior migraines and has been concerned about dizziness at prior appointments as she is today, which seems to temporally correlate with migraine activity as well.  It was difficult for her to elaborate on what the pulsating feels like for us to understand it a bit better.  She also notes nonpulsatile tinnitus on both sides.  The symptoms are significantly concerning to her.    She sought evaluation with Dr. Sow.  She was given a diagnosis of third window syndrome and surgery was recommended for the left side.  She is seeking additional input into this given that she had had surgery this previously.    Review of Systems:   Pertinent items are noted in HPI or as in patient entered ROS below,  remainder of complete ROS is negative.    ENT ROS 6/14/2022   Constitutional: -   Neurology: Headache   Psychology: -   Ears, Nose, Throat: Hearing loss, Ear pain, Ringing/noise in ears   Gastrointestinal/Genitourinary: -   Musculoskeletal: -   Other: -         Physical Exam:   /86   Pulse 71   Temp 98.6  F (37  C)   Ht 1.524 m (5')   Wt 51.7 kg (114 lb)   BMI 22.26 kg/m    Constitutional:  The patient was accompanied by her mom, well-groomed, and in no acute distress.     Skin: Normal:  warm and pink without rash   Neurologic: Alert and oriented x 3.  CN's III-XII within normal limits.  Voice normal.    Psychiatric: The patient's affect was calm, cooperative, and appropriate.     Communication:  Normal; communicates verbally, normal voice quality.   Respiratory: Breathing comfortably without stridor or exertion of accessory muscles.    Head/Face:  No lesions or scars. No sinus tenderness.    Salivary glands -  Normal size, no tenderness, swelling, or palpable masses.  The middle fossa incision has healed very well and there is no palpable scalp tenderness.   Eyes: Pupils were equal and reactive.  Extraocular movement intact.     Ears: Pinnae and tragus non-tender.        Otologic microscope exam:  Right ear: The ear canal is lined with healthy skin and the tympanic membrane is intact.  The middle ear space is aerated and the tympanic membrane is mobile, consistent with no evidence of recurrent effusion.  Left ear: The ear canal is lined with healthy skin and the tympanic membrane is intact and normal in appearance.  The middle ear space is aerated and the tympanic membrane is mobile.    Audiogram:  AUDIOGRAM: She underwent an audiogram today. This demonstrated:      The speech reception threshold is 40 dB on the right, 10 dB on the left, with 100% word recognition bilaterally. Tympanograms showed A type on the right and type A on the left.    *Note that the audiologist today indicated that significant  encouragement was required and that threshold improved during the testing.  Reliability was not considered ideal suggesting that hearing might be better in the right ear than this shows.  The left hearing thresholds are stable and normal which is reassuring given that this is the side of the symptoms.    *Note that the audiogram from [facility] 20[facility] was also reviewed and at that time the tested thresholds in the right ear were markedly worse in the mid and high frequencies.  This audiogram was independently reviewed and the images are scanned into our system.    Imaging:  CT Temporal Bones wo Contrast  Narrative: CT TEMPORAL BONES WO CONTRAST 2/19/2019 12:50 PM    Provided History: History of encephalocele repair; Hearing loss  ICD-10: Hearing loss     Comparison: CT temporal bone 4/2/2018.     Technique: Using multidetector thin collimation helical acquisition  technique, axial and coronal thin section CT images were reconstructed  through both temporal bones. Additional reconstructions performed in  the planes of Poschl and Stenver were also obtained. Images were  reviewed in a bone window.    Findings:     Right temporal bone: Prosthetic mesh covering the tegmen mastoideum  and tegmen tympani. Previously noted defect at the medial margin of  the mesh appears to have sealed off with sis osseous formation. No  definite communication between the right middle ear and posterior  cranial fossa. The mastoid air cells are clear. There is no debris in  the external auditory canal. The tympanic membrane is intact. There is  no fluid or soft tissue thickening in the right middle ear cavity. The  bony ossicles are intact and in normal alignment. The epitympanum is  clear. The bony scutum is sharp. There is probable focal cortical bone  defect over the right superior semicircular canal. The internal  auditory canal and the labyrinthine structures are within normal  limits. The facial nerve canal appears  normal along its course.    Left temporal bone: There is a 6.2 mm dehiscence of the tegmen tympani  with inferior protrusion of likely meningeal covered brain parenchyma  into the epitympanum of left middle ear without any fluid in the left  tympanic cavity. The mastoid air cells are clear. There is no debris  in the external auditory canal. The tympanic membrane is intact. The  bony scutum is sharp. The internal auditory canal and the labyrinthine  structures are within normal limits. The facial nerve canal appears  normal along its course.  Impression: Impression:   1. Left: Dehiscence of the left tegmen tympani leading to possible  left meningoencephalocele contacting the malleoincudal junction, but  without any fluid in the left middle ear cavity.  2. Right: Postsurgical changes of prosthetic mesh repair right  encephalocele . Previously seen defect along the medial prosthetic  margin appears to have sealed off with new bone formation, and the  fluid or debris in the epitympanum on the prior study has resolved.  Minimal debris along the stapes.    I have personally reviewed the examination and initial interpretation  and I agree with the findings.    DULCE WEAVER MD    The CT was independently reviewed.  The right ear appearance is excellent, with stability of the repair and healing over the middle ear with out any evidence of further encephalocele or effusion.  On the left, we see continued demonstration of contact between the middle fossa dura or a meningoencephalocele with the head of the malleus.  This was known from prior.  We do not see any evidence of CSF leak given that the middle ear space and mastoid air cells are aerated and this fits with the audiogram and physical exam.  We also do not see evidence of a third window lesion as bone over the semicircular canals is intact and there is not a clear dehiscence into the cochlea.     Assessment and Plan:  Michelle Fisher is a 36 year old female with:  1)  History of right middle ear meningoencephalocele, repaired in 2018, with excellent stability of the results and no evidence of disease recurrence.  Expectant management recommended.    2) CT evidence of contact between the middle fossa dura or a meningoencephalocele with the head of the malleus, which has been present on prior imaging.  There is no evidence of active CSF leak.  Given that this is her better hearing ear and that there are no apparent hearing changes, we would recommend ongoing observation for this rather than middle fossa surgery at this time.  We have previously reviewed the need for pneumococcal vaccination and she and her mom are both inclined to hold off on middle fossa surgery on the side unless it is absolutely necessary for worsening symptoms or active CSF leak.    3) Additional pulsating sensation on the left, which is difficult to characterize.  It is not pulsatile tinnitus as we clarified that on our conversation today.  We also do not see evidence of a third window lesion and do not recommend any sort of procedure to her round window or otherwise in this, her better hearing ear.  It is possible that she is perceiving pulsation of the brain on the malleus head and we discussed this today, but again she does not feel like it is a pulsing sound and has difficulty describing the bothersome sensation.  Our goal is to avoid harm in this instance and, given that there is limited enthusiasm for additional middle fossa surgery, expectant management is likely to be a good first choice.    4) We do recommend that she be evaluated for papilledema and follow-up with neuro-ophthalmology.  She and her family's preference is to not do this through our system but rather to go through her workplace and so she is going to pursue this but if there are difficulties arranging it she will contact us back.  We are considering this given her history of bilateral tegmen thinning and headaches.    5) Similarly,  evaluation with neurology is recommended.  She has had recalcitrant migraine and also has headache and potential for increased intracranial hypertension and will be very warranted for this evaluation to be completed.  She does wish to do this through her workplace health system, but again we will be happy to arrange this for her if there are challenges.  We would hope to see the result of the consult with ophthalmology and neurology.        Alexia Oglesby MD  Otology & Neurotology  [facility]

## 2022-09-17 ENCOUNTER — HEALTH MAINTENANCE LETTER (OUTPATIENT)
Age: 37
End: 2022-09-17

## 2023-06-04 ENCOUNTER — HEALTH MAINTENANCE LETTER (OUTPATIENT)
Age: 38
End: 2023-06-04

## 2024-07-13 ENCOUNTER — HEALTH MAINTENANCE LETTER (OUTPATIENT)
Age: 39
End: 2024-07-13

## (undated) DEVICE — GLOVE PROTEXIS MICRO 8.0  2D73PM80

## (undated) DEVICE — SHUNT BECKER EXTERNAL DRAIN 46128

## (undated) DEVICE — PREP POVIDONE IODINE SOLUTION 10% 120ML

## (undated) DEVICE — DRSG PRIMAPORE 03 1/8X6" 66000318

## (undated) DEVICE — NIM ELEC SUBDERMAL NDL 3PAIR/BOX

## (undated) DEVICE — ESU GROUND PAD ADULT W/CORD E7507

## (undated) DEVICE — CATH TRAY FOLEY SURESTEP 16FR W/URINE MTR STATLK LF A303416A

## (undated) DEVICE — KIT ACCESSORY LUMBAR DRAIN 910121

## (undated) DEVICE — GLOVE LINER HALF FINGER NYLON KP5015/50

## (undated) DEVICE — DRAPE SHEET REV FOLD 3/4 9349

## (undated) DEVICE — NIM PROBE PRASS INCREMENTING TIP 8225825

## (undated) DEVICE — PREP SKIN SCRUB TRAY 4461A

## (undated) DEVICE — LINEN TOWEL PACK X5 5464

## (undated) DEVICE — DRAPE POUCH INSTRUMENT 1018

## (undated) DEVICE — DRAPE SLEEVE 599

## (undated) DEVICE — TUBING ANSPACH GI IRRIG SGL PACK 05.001.178.01S

## (undated) DEVICE — LINEN TOWEL PACK X6 WHITE 5487

## (undated) DEVICE — SU VICRYL 2-0 CT-2 CR 8X18" J726D

## (undated) DEVICE — PERFORATOR 14MM CODMAN

## (undated) DEVICE — DRAPE WARMER 66X44" ORS-300

## (undated) DEVICE — PREP CHLORAPREP CLEAR 3ML 260400

## (undated) DEVICE — SPONGE COTTONOID 1/2X1 1/2" 20-06S

## (undated) DEVICE — DRAPE MAYO STAND 23X54 8337

## (undated) DEVICE — PACK GOWN 3/PK DISP XL SBA32GPFCB

## (undated) DEVICE — LINEN TOWEL PACK X30 5481

## (undated) DEVICE — PAD CHUX UNDERPAD 23X24" 7136

## (undated) DEVICE — SUCTION SYSTEM LEVINE IRRIGATION SP-3000

## (undated) DEVICE — DRAPE CRANIOTOMY W/POUCH 9450

## (undated) DEVICE — SPONGE COTTONOID 1/2X3" 20-07S

## (undated) DEVICE — SU VICRYL 0 CT-1 CR 8X18" J740D

## (undated) DEVICE — SYR 10ML LL W/O NDL 302995

## (undated) DEVICE — SOL WATER IRRIG 1000ML BOTTLE 2F7114

## (undated) DEVICE — PACK CRANIOTOMY

## (undated) DEVICE — SOL NACL 0.9% 10ML VIAL 0409-4888-02

## (undated) DEVICE — BUR BALL 2MM DIAMOND COARSE EXT ANSPACH S-2DC-G1

## (undated) DEVICE — BUR ROUTER 1.4X12.8MM ANSPACH S-1R

## (undated) DEVICE — SU VICRYL 4-0 TF CR 8X18" J743D

## (undated) DEVICE — DECANTER VIAL 2006S

## (undated) DEVICE — CRANIOTOME ADULT ANSPACH A-CRN

## (undated) DEVICE — DRAPE IOBAN INCISE 13X13" 6640EZ

## (undated) DEVICE — DRSG GAUZE 4X4" TRAY 6939

## (undated) DEVICE — GLOVE PROTEXIS BLUE W/NEU-THERA 8.5  2D73EB85

## (undated) DEVICE — SPONGE SURGIFOAM 100 1974

## (undated) DEVICE — SOL NACL 0.9% IRRIG 1000ML BOTTLE 2F7124

## (undated) DEVICE — SU SILK 2-0 TIE 12X30" A305H

## (undated) DEVICE — SU ETHILON 3-0 PS-1 18" 1663H

## (undated) DEVICE — BLADE KNIFE BEAVER SICKLE EDGE 5.0MM 377300

## (undated) DEVICE — RETR ELASTIC STAYS LONE STAR BLUNT DUAL LEAD 3550-1G

## (undated) DEVICE — NDL BLUNT 18GA 1" W/O FILTER 305181

## (undated) DEVICE — SPONGE COTTONOID 1/4X1/4" 20-01S

## (undated) DEVICE — ESU CORD BIPOLAR AND IRR TUBING AESCULAP US355

## (undated) DEVICE — PIN SKULL MAYFIELD ADULT TITANIUM 3/PK A1120

## (undated) DEVICE — DECANTER BAG 2002S

## (undated) DEVICE — SU VICRYL 2-0 CT-2 27" J333H

## (undated) DEVICE — SUCTION MANIFOLD DORNOCH ULTRA CART UL-CL500

## (undated) DEVICE — SOL RINGERS LACTATED 1000ML BAG 07953-09

## (undated) DEVICE — WIPES FOLEY CARE SURESTEP PROVON DFC100

## (undated) DEVICE — RX BACITRACIN OINTMENT 0.9G 1/32OZ CUR001109

## (undated) DEVICE — DRAPE MICROSCOPE LEICA 54X150" AR8033650

## (undated) DEVICE — BLADE KNIFE BEAVER MICROSHARP GREEN 377515

## (undated) RX ORDER — LIDOCAINE HYDROCHLORIDE AND EPINEPHRINE 10; 10 MG/ML; UG/ML
INJECTION, SOLUTION INFILTRATION; PERINEURAL
Status: DISPENSED
Start: 2018-01-11

## (undated) RX ORDER — SCOLOPAMINE TRANSDERMAL SYSTEM 1 MG/1
PATCH, EXTENDED RELEASE TRANSDERMAL
Status: DISPENSED
Start: 2018-01-11

## (undated) RX ORDER — FENTANYL CITRATE 50 UG/ML
INJECTION, SOLUTION INTRAMUSCULAR; INTRAVENOUS
Status: DISPENSED
Start: 2018-01-11

## (undated) RX ORDER — BACITRACIN 50000 [IU]/1
INJECTION, POWDER, FOR SOLUTION INTRAMUSCULAR
Status: DISPENSED
Start: 2018-01-11

## (undated) RX ORDER — ONDANSETRON 2 MG/ML
INJECTION INTRAMUSCULAR; INTRAVENOUS
Status: DISPENSED
Start: 2018-01-11